# Patient Record
Sex: MALE | Race: WHITE | NOT HISPANIC OR LATINO | Employment: FULL TIME | URBAN - METROPOLITAN AREA
[De-identification: names, ages, dates, MRNs, and addresses within clinical notes are randomized per-mention and may not be internally consistent; named-entity substitution may affect disease eponyms.]

---

## 2010-03-17 LAB — CRC RECOMMENDATION EXT: NORMAL

## 2016-02-10 LAB — CRC RECOMMENDATION EXT: NORMAL

## 2019-02-25 ENCOUNTER — OFFICE VISIT (OUTPATIENT)
Dept: FAMILY MEDICINE | Facility: CLINIC | Age: 64
End: 2019-02-25
Payer: COMMERCIAL

## 2019-02-25 VITALS
OXYGEN SATURATION: 97 % | TEMPERATURE: 98 F | DIASTOLIC BLOOD PRESSURE: 72 MMHG | HEART RATE: 71 BPM | BODY MASS INDEX: 36.05 KG/M2 | SYSTOLIC BLOOD PRESSURE: 132 MMHG | WEIGHT: 257.5 LBS | HEIGHT: 71 IN

## 2019-02-25 DIAGNOSIS — R09.81 NASAL SINUS CONGESTION: Primary | ICD-10-CM

## 2019-02-25 PROCEDURE — 1101F PT FALLS ASSESS-DOCD LE1/YR: CPT | Mod: CPTII,,, | Performed by: FAMILY MEDICINE

## 2019-02-25 PROCEDURE — 1101F PR PT FALLS ASSESS DOC 0-1 FALLS W/OUT INJ PAST YR: ICD-10-PCS | Mod: CPTII,,, | Performed by: FAMILY MEDICINE

## 2019-02-25 PROCEDURE — 99213 OFFICE O/P EST LOW 20 MIN: CPT | Mod: ,,, | Performed by: FAMILY MEDICINE

## 2019-02-25 PROCEDURE — 99213 PR OFFICE/OUTPT VISIT, EST, LEVL III, 20-29 MIN: ICD-10-PCS | Mod: ,,, | Performed by: FAMILY MEDICINE

## 2019-02-25 RX ORDER — CETIRIZINE HYDROCHLORIDE 10 MG/1
10 TABLET ORAL NIGHTLY
Qty: 30 TABLET | Refills: 0 | COMMUNITY
Start: 2019-02-25 | End: 2020-02-04 | Stop reason: ALTCHOICE

## 2019-02-25 RX ORDER — ALFUZOSIN HYDROCHLORIDE 10 MG/1
TABLET, EXTENDED RELEASE ORAL
COMMUNITY

## 2019-02-25 RX ORDER — MONTELUKAST SODIUM 10 MG/1
10 TABLET ORAL DAILY
Qty: 30 TABLET | Refills: 0 | Status: SHIPPED | OUTPATIENT
Start: 2019-02-25 | End: 2019-03-27

## 2019-02-25 RX ORDER — SIMVASTATIN 20 MG/1
TABLET, FILM COATED ORAL
COMMUNITY
End: 2019-08-06

## 2019-02-25 RX ORDER — AMOXICILLIN 500 MG
1 CAPSULE ORAL WEEKLY
COMMUNITY

## 2019-02-25 NOTE — PROGRESS NOTES
"Subjective:       Patient ID: Jamal Luna is a 64 y.o. male.    Chief Complaint: Nasal Congestion (Pt stated that when he blows his nose, green phlem comes out of only his left nostril. Pt took some Mucinex DM last night.) and Sore Throat (Pt stated that he is hoarse. Pt took cough drops but did not help.)    65 yo M here for congestion x 2 days. Pt has "green stuff" coming out of the left nostril. Pt had nasal surgery 2 months ago in [place], [place]. He had nasal passages opened bilaterally at that time. Pt does not have an appt to go back. Pt now tells that 3 weeks ago the left nostril was infected and he had to be put on some antibiotics.       Review of Systems   Constitutional: Negative for chills, fatigue and fever.   HENT: Positive for congestion. Negative for drooling, sneezing and sore throat.    Eyes: Negative for pain and visual disturbance.   Respiratory: Negative for cough and shortness of breath.    Cardiovascular: Negative for chest pain.   Gastrointestinal: Negative for abdominal pain, constipation, diarrhea and nausea.   Endocrine: Negative for cold intolerance and heat intolerance.   Genitourinary: Negative for difficulty urinating and frequency.   Musculoskeletal: Negative for myalgias.   Allergic/Immunologic: Negative for food allergies.   Neurological: Negative for seizures and headaches.   Psychiatric/Behavioral: Negative for behavioral problems.       Objective:      Physical Exam   Constitutional: He appears well-developed.   HENT:   Right Ear: External ear normal.   Left Ear: External ear normal.   Mouth/Throat: Oropharynx is clear and moist.   Eyes: Conjunctivae and EOM are normal.   Neck: Normal range of motion.   Cardiovascular: Normal rate, regular rhythm and intact distal pulses.   Pulmonary/Chest: Effort normal and breath sounds normal.   Abdominal: Soft.   Musculoskeletal: Normal range of motion.   Neurological: He is alert.   Skin: Skin is warm. Capillary refill takes less than 2 " seconds.   Psychiatric: He has a normal mood and affect.   Nursing note and vitals reviewed.      Assessment:       1. Nasal sinus congestion        Plan:       PROBLEM LIST  No problem-specific Assessment & Plan notes found for this encounter.        Jamal was seen today for nasal congestion and sore throat.    Diagnoses and all orders for this visit:    Nasal sinus congestion

## 2019-02-25 NOTE — PATIENT INSTRUCTIONS
"Call your ENT surgeon in [place]. If he wants us to put you on Antibiotics he can fax us what he likes us to do. Best if there is supposed to be a "standard of care"  Right now we'll dry you out with zyrtec at night and singulair in the morning.  "

## 2019-04-11 ENCOUNTER — OFFICE VISIT (OUTPATIENT)
Dept: FAMILY MEDICINE | Facility: CLINIC | Age: 64
End: 2019-04-11
Payer: COMMERCIAL

## 2019-04-11 VITALS
TEMPERATURE: 98 F | HEIGHT: 71 IN | BODY MASS INDEX: 34.65 KG/M2 | HEART RATE: 56 BPM | SYSTOLIC BLOOD PRESSURE: 136 MMHG | OXYGEN SATURATION: 95 % | WEIGHT: 247.5 LBS | DIASTOLIC BLOOD PRESSURE: 84 MMHG

## 2019-04-11 DIAGNOSIS — N52.9 ERECTILE DYSFUNCTION, UNSPECIFIED ERECTILE DYSFUNCTION TYPE: Primary | ICD-10-CM

## 2019-04-11 PROCEDURE — 1101F PT FALLS ASSESS-DOCD LE1/YR: CPT | Mod: CPTII,S$GLB,, | Performed by: FAMILY MEDICINE

## 2019-04-11 PROCEDURE — 99213 PR OFFICE/OUTPT VISIT, EST, LEVL III, 20-29 MIN: ICD-10-PCS | Mod: S$GLB,,, | Performed by: FAMILY MEDICINE

## 2019-04-11 PROCEDURE — 1101F PR PT FALLS ASSESS DOC 0-1 FALLS W/OUT INJ PAST YR: ICD-10-PCS | Mod: CPTII,S$GLB,, | Performed by: FAMILY MEDICINE

## 2019-04-11 PROCEDURE — 99213 OFFICE O/P EST LOW 20 MIN: CPT | Mod: S$GLB,,, | Performed by: FAMILY MEDICINE

## 2019-04-11 RX ORDER — SILDENAFIL 100 MG/1
100 TABLET, FILM COATED ORAL DAILY PRN
Qty: 1 TABLET | Refills: 5 | Status: SHIPPED | OUTPATIENT
Start: 2019-04-11 | End: 2019-04-12

## 2019-04-11 RX ORDER — SILDENAFIL 100 MG/1
100 TABLET, FILM COATED ORAL DAILY PRN
Qty: 10 TABLET | Refills: 3 | Status: SHIPPED | OUTPATIENT
Start: 2019-04-11 | End: 2019-06-04 | Stop reason: SDUPTHER

## 2019-04-11 NOTE — PROGRESS NOTES
Subjective:       Patient ID: Jamal Luna is a 64 y.o. male.    Chief Complaint: Other (Pt stated that he is here to get a Rx for Viagra. Pt stated that he has had it before.)    63 yo M here for ED. Pt has had viagra 100mg in the past and it worked alright. He would like a script for just one pill. Discussed the no-brand version (sildenafil) and the GoodRx card.   No other problems or concerns    Review of Systems   Constitutional: Negative for chills, fatigue and fever.   HENT: Negative for congestion, drooling, sneezing and sore throat.    Eyes: Negative for pain and visual disturbance.   Respiratory: Negative for cough and shortness of breath.    Cardiovascular: Negative for chest pain.   Gastrointestinal: Negative for abdominal pain, constipation, diarrhea and nausea.   Endocrine: Negative for cold intolerance and heat intolerance.   Genitourinary: Negative for difficulty urinating and frequency.   Musculoskeletal: Negative for myalgias.   Allergic/Immunologic: Negative for food allergies.   Neurological: Negative for seizures and headaches.   Psychiatric/Behavioral: Negative for behavioral problems.       Objective:      Physical Exam   Constitutional: He appears well-developed.   HENT:   Right Ear: External ear normal.   Left Ear: External ear normal.   Mouth/Throat: Oropharynx is clear and moist.   Eyes: Conjunctivae and EOM are normal.   Neck: Normal range of motion.   Cardiovascular: Normal rate, regular rhythm and intact distal pulses.   Pulmonary/Chest: Effort normal and breath sounds normal.   Abdominal: Soft.   Musculoskeletal: Normal range of motion.   Neurological: He is alert.   Skin: Skin is warm. Capillary refill takes less than 2 seconds.   Psychiatric: He has a normal mood and affect.   Nursing note and vitals reviewed.      Assessment:       1. Erectile dysfunction, unspecified erectile dysfunction type        Plan:       PROBLEM LIST     Jamal was seen today for other.    Diagnoses and  all orders for this visit:    Erectile dysfunction, unspecified erectile dysfunction type    Other orders  -     sildenafil (VIAGRA) 100 MG tablet; Take 1 tablet (100 mg total) by mouth daily as needed for Erectile Dysfunction.  -     sildenafil (VIAGRA) 100 MG tablet; Take 1 tablet (100 mg total) by mouth daily as needed for Erectile Dysfunction.

## 2019-05-16 ENCOUNTER — OFFICE VISIT (OUTPATIENT)
Dept: FAMILY MEDICINE | Facility: CLINIC | Age: 64
End: 2019-05-16
Payer: COMMERCIAL

## 2019-05-16 VITALS
OXYGEN SATURATION: 96 % | WEIGHT: 250.13 LBS | HEIGHT: 71 IN | BODY MASS INDEX: 35.02 KG/M2 | SYSTOLIC BLOOD PRESSURE: 138 MMHG | DIASTOLIC BLOOD PRESSURE: 84 MMHG | TEMPERATURE: 98 F | HEART RATE: 66 BPM

## 2019-05-16 DIAGNOSIS — L02.522: Primary | ICD-10-CM

## 2019-05-16 DIAGNOSIS — N52.9 ERECTILE DYSFUNCTION, UNSPECIFIED ERECTILE DYSFUNCTION TYPE: ICD-10-CM

## 2019-05-16 PROCEDURE — 1101F PR PT FALLS ASSESS DOC 0-1 FALLS W/OUT INJ PAST YR: ICD-10-PCS | Mod: CPTII,S$GLB,, | Performed by: FAMILY MEDICINE

## 2019-05-16 PROCEDURE — 99213 OFFICE O/P EST LOW 20 MIN: CPT | Mod: S$GLB,,, | Performed by: FAMILY MEDICINE

## 2019-05-16 PROCEDURE — 99213 PR OFFICE/OUTPT VISIT, EST, LEVL III, 20-29 MIN: ICD-10-PCS | Mod: S$GLB,,, | Performed by: FAMILY MEDICINE

## 2019-05-16 PROCEDURE — 1101F PT FALLS ASSESS-DOCD LE1/YR: CPT | Mod: CPTII,S$GLB,, | Performed by: FAMILY MEDICINE

## 2019-05-16 NOTE — PATIENT INSTRUCTIONS
Use a warm wet rag on the dorsum of your hand - as much as possible, this will help with the draining  Then put a dab of the saph on and bandage and protect it.

## 2019-05-16 NOTE — PROGRESS NOTES
Subjective:       Patient ID: Jamal Luna is a 64 y.o. male.    Chief Complaint: Hand Pain (Pt stated that he has left hand pain. Pt stated that he has a sore that started about 5 wks ago but has increased in size for the past 3wks. Pt stated he has been soaking it in salt water and the swelling did come down but not all the way. Pt stated that the sore hurts more if he presses down on it.)    65 yo M here with a draining boil on his left dorsum of his hand. This has been going on for 5 weeks. Pt does not remember any cause such as a bite a sting or poke of any sort. He uses his hand to get stuff out of his left front pocket and that's when it hurts most.   He also states that the sildenafil does not work for his ED. He has only taken one pill at a time and that did not work. He also only got 10 pills dispensed as he went through his insurance and not [employer] card as instructed in his last visit. He was also re-educated that he can take up to 5 tabs of sildenafil as that would make up the same dosage of viagra 100 mg. Pt neither read my instructions nor remembered them till we discussed it again.     Review of Systems   Constitutional: Negative for chills, fatigue and fever.   HENT: Negative for congestion, drooling, sneezing and sore throat.    Eyes: Negative for pain and visual disturbance.   Respiratory: Negative for cough and shortness of breath.    Cardiovascular: Negative for chest pain.   Gastrointestinal: Negative for abdominal pain, constipation, diarrhea and nausea.   Endocrine: Negative for cold intolerance and heat intolerance.   Genitourinary: Negative for difficulty urinating and frequency.   Musculoskeletal: Negative for myalgias.   Allergic/Immunologic: Negative for food allergies.   Neurological: Negative for seizures and headaches.   Psychiatric/Behavioral: Negative for behavioral problems.       Objective:      Physical Exam   Constitutional: He appears well-developed.   HENT:   Right Ear:  External ear normal.   Left Ear: External ear normal.   Mouth/Throat: Oropharynx is clear and moist.   Eyes: Conjunctivae and EOM are normal.   Neck: Normal range of motion.   Cardiovascular: Normal rate, regular rhythm and intact distal pulses.   Pulmonary/Chest: Effort normal and breath sounds normal.   Abdominal: Soft.   Musculoskeletal: Normal range of motion.   Neurological: He is alert.   Skin: Skin is warm. Capillary refill takes less than 2 seconds.   Small scabbed ulcer on left dorsum of hand - no erythematous halo or other indications that there is any cellulitis involved   Psychiatric: He has a normal mood and affect.   Nursing note and vitals reviewed.      Assessment:       1. Boil, hand, left    2. Erectile dysfunction, unspecified erectile dysfunction type        Plan:       PROBLEM LIST     Jamal was seen today for hand pain.    Diagnoses and all orders for this visit:    Boil, hand, left    Erectile dysfunction, unspecified erectile dysfunction type    boil: use a warm wet rag on your hand wound to let the content drain out. Use the bacitracin I gave you and cover with some squishy material to protect skin from pressure/new abbrasions  ED: use sildenafil as instructed

## 2019-06-04 ENCOUNTER — OFFICE VISIT (OUTPATIENT)
Dept: FAMILY MEDICINE | Facility: CLINIC | Age: 64
End: 2019-06-04
Payer: COMMERCIAL

## 2019-06-04 VITALS
TEMPERATURE: 98 F | SYSTOLIC BLOOD PRESSURE: 132 MMHG | DIASTOLIC BLOOD PRESSURE: 94 MMHG | BODY MASS INDEX: 34.63 KG/M2 | HEART RATE: 63 BPM | HEIGHT: 71 IN | WEIGHT: 247.38 LBS | OXYGEN SATURATION: 98 %

## 2019-06-04 DIAGNOSIS — N52.9 ERECTILE DYSFUNCTION, UNSPECIFIED ERECTILE DYSFUNCTION TYPE: ICD-10-CM

## 2019-06-04 DIAGNOSIS — G47.00 INSOMNIA, UNSPECIFIED TYPE: ICD-10-CM

## 2019-06-04 DIAGNOSIS — E78.5 HYPERLIPIDEMIA, UNSPECIFIED HYPERLIPIDEMIA TYPE: Primary | ICD-10-CM

## 2019-06-04 DIAGNOSIS — J44.9 CHRONIC OBSTRUCTIVE PULMONARY DISEASE, UNSPECIFIED COPD TYPE: ICD-10-CM

## 2019-06-04 DIAGNOSIS — G47.33 OSA (OBSTRUCTIVE SLEEP APNEA): ICD-10-CM

## 2019-06-04 PROCEDURE — 99213 PR OFFICE/OUTPT VISIT, EST, LEVL III, 20-29 MIN: ICD-10-PCS | Mod: S$GLB,,, | Performed by: FAMILY MEDICINE

## 2019-06-04 PROCEDURE — 1101F PR PT FALLS ASSESS DOC 0-1 FALLS W/OUT INJ PAST YR: ICD-10-PCS | Mod: CPTII,S$GLB,, | Performed by: FAMILY MEDICINE

## 2019-06-04 PROCEDURE — 99213 OFFICE O/P EST LOW 20 MIN: CPT | Mod: S$GLB,,, | Performed by: FAMILY MEDICINE

## 2019-06-04 PROCEDURE — 1101F PT FALLS ASSESS-DOCD LE1/YR: CPT | Mod: CPTII,S$GLB,, | Performed by: FAMILY MEDICINE

## 2019-06-04 RX ORDER — SILDENAFIL 100 MG/1
100 TABLET, FILM COATED ORAL DAILY PRN
Qty: 30 TABLET | Refills: 0 | Status: SHIPPED | OUTPATIENT
Start: 2019-06-04 | End: 2020-06-17 | Stop reason: SDUPTHER

## 2019-06-04 NOTE — PROGRESS NOTES
Subjective:       Patient ID: Jamal Luna is a 64 y.o. male.    Chief Complaint: Medication Problem (Pt is here to see if he can get something equivalant to the simvastatin that he is on. Pt stated that he has been having aches & pains through out the day & night. Pt stated that he is unable to stay asleep and wakes up very early. Pt stated that since he has not been sleeping he feels very fatigue through out the day as well. Pt stated that he can't concentrate on anything. Pt stated that he has had the symptoms for about 3mo and he stopped his simvastatin 2wks ago.) and Referral needed (Pt stated that he would like a referral to see a pulmonologist about his COPD.)    63 yo M here to discuss his medication. Pt was put on simvastatin for years. Pt thinks his joint soreness and insomnia might be related to him taking simvastatin 20mg. He took himself off 2 weeks ago and his joints feel better but he still has insomnia. Pt has CARRILLO and he is on a CPAP.   Pt stated that since he has not been sleeping he feels very fatigue through out the day as well. Pt stated that he can't concentrate on anything.  Pt needs a refill on sildenafil - explained again that this med is as needed only        Review of Systems   Constitutional: Positive for fatigue. Negative for chills and fever.   HENT: Negative for congestion, drooling, sneezing and sore throat.    Eyes: Negative for pain and visual disturbance.   Respiratory: Positive for cough. Negative for shortness of breath.    Cardiovascular: Negative for chest pain.   Gastrointestinal: Negative for abdominal pain, constipation, diarrhea and nausea.   Endocrine: Negative for cold intolerance and heat intolerance.   Genitourinary: Negative for difficulty urinating and frequency.   Musculoskeletal: Negative for myalgias.   Allergic/Immunologic: Negative for food allergies.   Neurological: Negative for seizures and headaches.   Psychiatric/Behavioral: Negative for behavioral  problems.       Objective:      Physical Exam   Constitutional: He appears well-developed.   HENT:   Right Ear: External ear normal.   Left Ear: External ear normal.   Mouth/Throat: Oropharynx is clear and moist.   Eyes: Conjunctivae and EOM are normal.   Neck: Normal range of motion.   Cardiovascular: Normal rate, regular rhythm and intact distal pulses.   Pulmonary/Chest: Effort normal and breath sounds normal.   Abdominal: Soft.   Musculoskeletal: Normal range of motion.   Neurological: He is alert.   Skin: Skin is warm. Capillary refill takes less than 2 seconds.   Psychiatric: He has a normal mood and affect.   Nursing note and vitals reviewed.      Assessment:       1. Hyperlipidemia, unspecified hyperlipidemia type    2. Chronic obstructive pulmonary disease, unspecified COPD type    3. Insomnia, unspecified type    4. CARRILLO (obstructive sleep apnea)    5. Erectile dysfunction, unspecified erectile dysfunction type        Plan:       PROBLEM LIST     Jamal was seen today for medication problem and referral needed.    Diagnoses and all orders for this visit:    Hyperlipidemia, unspecified hyperlipidemia type    Chronic obstructive pulmonary disease, unspecified COPD type  -     Ambulatory Referral to Pulmonology    Insomnia, unspecified type    CARRILLO (obstructive sleep apnea)  -     Ambulatory Referral to Pulmonology    Erectile dysfunction, unspecified erectile dysfunction type  -     sildenafil (VIAGRA) 100 MG tablet; Take 1 tablet (100 mg total) by mouth daily as needed for Erectile Dysfunction.

## 2019-06-24 ENCOUNTER — OFFICE VISIT (OUTPATIENT)
Dept: FAMILY MEDICINE | Facility: CLINIC | Age: 64
End: 2019-06-24
Payer: COMMERCIAL

## 2019-06-24 VITALS
WEIGHT: 243 LBS | TEMPERATURE: 98 F | BODY MASS INDEX: 34.02 KG/M2 | HEIGHT: 71 IN | DIASTOLIC BLOOD PRESSURE: 62 MMHG | HEART RATE: 72 BPM | OXYGEN SATURATION: 98 % | SYSTOLIC BLOOD PRESSURE: 130 MMHG | RESPIRATION RATE: 16 BRPM

## 2019-06-24 DIAGNOSIS — B02.9 HERPES ZOSTER WITHOUT COMPLICATION: Primary | ICD-10-CM

## 2019-06-24 DIAGNOSIS — E78.00 PURE HYPERCHOLESTEROLEMIA: ICD-10-CM

## 2019-06-24 DIAGNOSIS — Z12.5 SCREENING FOR MALIGNANT NEOPLASM OF PROSTATE: ICD-10-CM

## 2019-06-24 PROCEDURE — 1101F PT FALLS ASSESS-DOCD LE1/YR: CPT | Mod: CPTII,S$GLB,, | Performed by: NURSE PRACTITIONER

## 2019-06-24 PROCEDURE — 1101F PR PT FALLS ASSESS DOC 0-1 FALLS W/OUT INJ PAST YR: ICD-10-PCS | Mod: CPTII,S$GLB,, | Performed by: NURSE PRACTITIONER

## 2019-06-24 PROCEDURE — 99213 PR OFFICE/OUTPT VISIT, EST, LEVL III, 20-29 MIN: ICD-10-PCS | Mod: S$GLB,,, | Performed by: NURSE PRACTITIONER

## 2019-06-24 PROCEDURE — 99213 OFFICE O/P EST LOW 20 MIN: CPT | Mod: S$GLB,,, | Performed by: NURSE PRACTITIONER

## 2019-06-24 RX ORDER — GABAPENTIN 300 MG/1
300 CAPSULE ORAL 3 TIMES DAILY PRN
Qty: 90 CAPSULE | Refills: 0 | Status: SHIPPED | OUTPATIENT
Start: 2019-06-24 | End: 2019-07-01 | Stop reason: DRUGHIGH

## 2019-06-24 NOTE — PROGRESS NOTES
Subjective:       Patient ID: Jamal Luna is a 64 y.o. male.    Chief Complaint: Follow-up (F/U for shingles)    HPI     Lesion outbreak on scalp. Pain on scalp started last Wed. Blister-type lesions erupted last Friday on left side of scalp. Went to the ED in [place]. He was told he had shingles. Started on Valtrex x 1 week. Lesions are better, starting to dry. Pain is still bad. Prevents him from sleeping at night.       Review of Systems   Constitutional: Negative for activity change, appetite change, chills, fatigue and fever.   Respiratory: Negative for apnea, cough, chest tightness, shortness of breath and wheezing.    Cardiovascular: Negative for chest pain, palpitations and leg swelling.   Gastrointestinal: Negative for abdominal distention, abdominal pain, blood in stool, constipation, diarrhea and vomiting.   Genitourinary: Negative for difficulty urinating, dysuria, flank pain, frequency and urgency.   Musculoskeletal: Negative for arthralgias, back pain, gait problem, joint swelling and neck pain.   Skin: Positive for rash. Negative for color change and pallor.   Neurological: Negative for dizziness, tremors, syncope, weakness, light-headedness and numbness.   Hematological: Negative for adenopathy. Does not bruise/bleed easily.   Psychiatric/Behavioral: Negative for agitation, confusion and sleep disturbance. The patient is not nervous/anxious.        Objective:      Physical Exam   Constitutional: He is oriented to person, place, and time. Vital signs are normal. He appears well-developed and well-nourished.   HENT:   Head: Normocephalic and atraumatic.   Mouth/Throat: Oropharynx is clear and moist.   Eyes: Pupils are equal, round, and reactive to light. Conjunctivae are normal.   Neck: Trachea normal and normal range of motion. Neck supple. Carotid bruit is not present.   Cardiovascular: Normal rate, regular rhythm and normal heart sounds.   Pulmonary/Chest: Effort normal and breath sounds  normal.   Abdominal: Soft. Bowel sounds are normal.   Musculoskeletal: Normal range of motion.   Neurological: He is alert and oriented to person, place, and time.   Skin: Skin is warm, dry and intact. Rash (drying hepetic lesions left side of scalp, left of sagital suture line on top of head) noted.   Psychiatric: He has a normal mood and affect. His speech is normal and behavior is normal. Judgment normal.       Assessment:       1. Herpes zoster without complication    2. Pure hypercholesterolemia    3. Screening for malignant neoplasm of prostate        Plan:       PROBLEM LIST     Jamal was seen today for follow-up.    Diagnoses and all orders for this visit:    Herpes zoster without complication  -     gabapentin (NEURONTIN) 300 MG capsule; Take 1 capsule (300 mg total) by mouth 3 (three) times daily as needed.    Pure hypercholesterolemia  -     CBC auto differential; Future  -     Comprehensive metabolic panel; Future  -     Lipid panel; Future  -     Hemoglobin A1c; Future  -     CBC auto differential  -     Comprehensive metabolic panel  -     Lipid panel  -     Hemoglobin A1c    Screening for malignant neoplasm of prostate  -     Prostate Specific Antigen, Diagnostic; Future  -     Prostate Specific Antigen, Diagnostic    Take all Valtrex until complete. Discussed getting Shingrix vaccine 6 mo after lesions fully heal. Start on gabapentin for pain.    Pt requesting annual wellness labs. Provided w/ lab orders. RTC next week for lab review. He states that he has not been compliant w/ his statin therapy.

## 2019-06-25 LAB
ABS NRBC COUNT: 0 X 10 3/UL (ref 0–0.01)
ABSOLUTE BASOPHIL: 0.03 X 10 3/UL (ref 0–0.22)
ABSOLUTE EOSINOPHIL: 0.28 X 10 3/UL (ref 0.04–0.54)
ABSOLUTE IMMATURE GRAN: 0.02 X 10 3/UL (ref 0–0.04)
ABSOLUTE LYMPHOCYTE: 1 X 10 3/UL (ref 0.86–4.75)
ABSOLUTE MONOCYTE: 0.62 X 10 3/UL (ref 0.22–1.08)
ALBUMIN SERPL-MCNC: 4.3 G/DL (ref 3.5–5.2)
ALBUMIN/GLOB SERPL ELPH: 1.4 {RATIO} (ref 1–2.7)
ALP ISOS SERPL LEV INH-CCNC: 78 IU/L (ref 40–130)
ALT (SGPT): 30 U/L (ref 0–41)
ANION GAP SERPL CALC-SCNC: 13 MMOL/L (ref 8–17)
AST SERPL-CCNC: 23 U/L (ref 0–40)
BASOPHILS NFR BLD: 0.5 %
BILIRUBIN, TOTAL: 0.41 MG/DL (ref 0–1.2)
BUN/CREAT SERPL: 15.2 (ref 6–20)
CALCIUM SERPL-MCNC: 9.8 MG/DL (ref 8.6–10.2)
CARBON DIOXIDE, CO2: 31 MMOL/L (ref 22–29)
CHLORIDE: 99 MMOL/L (ref 98–107)
CHOLEST SERPL-MSCNC: 178 MG/DL (ref 100–200)
CREAT SERPL-MCNC: 0.99 MG/DL (ref 0.7–1.2)
EOSINOPHIL NFR BLD: 4.7 %
ESTIMATED AVERAGE GLUCOSE: 137 MG/DL
GFR ESTIMATION: 75.63
GLOBULIN: 3.1 G/DL (ref 1.5–4.5)
GLUCOSE: 114 MG/DL (ref 82–115)
HBA1C MFR BLD: 6.4 % (ref 4–6)
HCT VFR BLD AUTO: 47.1 % (ref 42–52)
HDLC SERPL-MCNC: 42 MG/DL
HGB BLD-MCNC: 15.1 G/DL (ref 14–18)
IMMATURE GRANULOCYTES: 0.3 % (ref 0–0.5)
LDL/HDL RATIO: 2.7 (ref 1–3)
LDLC SERPL CALC-MCNC: 112 MG/DL (ref 0–100)
LYMPHOCYTES NFR BLD: 16.8 %
MCH RBC QN AUTO: 31.1 PG (ref 27–32)
MCHC RBC AUTO-ENTMCNC: 32.1 G/DL (ref 32–36)
MCV RBC AUTO: 97.1 FL (ref 80–94)
MONOCYTES NFR BLD: 10.4 %
NEUTROPHILS ABSOLUTE COUNT: 3.99 X 10 3/UL (ref 2.15–7.56)
NEUTROPHILS NFR BLD: 67.3 %
NUCLEATED RED BLOOD CELLS: 0 /100 WBC (ref 0–0.2)
PLATELET # BLD AUTO: 210 X 10 3/UL (ref 135–400)
POTASSIUM: 5.1 MMOL/L (ref 3.5–5.1)
PROT SNV-MCNC: 7.4 G/DL (ref 6.4–8.3)
PSA: 1.9 NG/ML (ref 0–4)
RBC # BLD AUTO: 4.85 X 10 6/UL (ref 4.7–6.1)
RDW-SD: 54.4 FL (ref 37–54)
SODIUM: 143 MMOL/L (ref 136–145)
TRIGL SERPL-MCNC: 120 MG/DL (ref 0–150)
UREA NITROGEN (BUN): 15 MG/DL (ref 8–23)
WBC # BLD: 5.94 X 10 3/UL (ref 4.3–10.8)

## 2019-07-01 ENCOUNTER — OFFICE VISIT (OUTPATIENT)
Dept: FAMILY MEDICINE | Facility: CLINIC | Age: 64
End: 2019-07-01
Payer: COMMERCIAL

## 2019-07-01 VITALS
SYSTOLIC BLOOD PRESSURE: 130 MMHG | TEMPERATURE: 97 F | BODY MASS INDEX: 34.95 KG/M2 | OXYGEN SATURATION: 97 % | RESPIRATION RATE: 18 BRPM | HEIGHT: 71 IN | DIASTOLIC BLOOD PRESSURE: 84 MMHG | WEIGHT: 249.63 LBS | HEART RATE: 68 BPM

## 2019-07-01 DIAGNOSIS — J44.9 CHRONIC OBSTRUCTIVE PULMONARY DISEASE, UNSPECIFIED COPD TYPE: ICD-10-CM

## 2019-07-01 DIAGNOSIS — G47.09 OTHER INSOMNIA: ICD-10-CM

## 2019-07-01 DIAGNOSIS — R73.03 PREDIABETES: ICD-10-CM

## 2019-07-01 DIAGNOSIS — E78.00 PURE HYPERCHOLESTEROLEMIA: ICD-10-CM

## 2019-07-01 DIAGNOSIS — B02.9 HERPES ZOSTER WITHOUT COMPLICATION: Primary | ICD-10-CM

## 2019-07-01 PROCEDURE — 1101F PT FALLS ASSESS-DOCD LE1/YR: CPT | Mod: CPTII,S$GLB,, | Performed by: NURSE PRACTITIONER

## 2019-07-01 PROCEDURE — 99213 PR OFFICE/OUTPT VISIT, EST, LEVL III, 20-29 MIN: ICD-10-PCS | Mod: S$GLB,,, | Performed by: NURSE PRACTITIONER

## 2019-07-01 PROCEDURE — 1101F PR PT FALLS ASSESS DOC 0-1 FALLS W/OUT INJ PAST YR: ICD-10-PCS | Mod: CPTII,S$GLB,, | Performed by: NURSE PRACTITIONER

## 2019-07-01 PROCEDURE — 99213 OFFICE O/P EST LOW 20 MIN: CPT | Mod: S$GLB,,, | Performed by: NURSE PRACTITIONER

## 2019-07-01 RX ORDER — GABAPENTIN 400 MG/1
400 CAPSULE ORAL 3 TIMES DAILY
Qty: 90 CAPSULE | Refills: 3 | Status: SHIPPED | OUTPATIENT
Start: 2019-07-01 | End: 2020-02-04 | Stop reason: ALTCHOICE

## 2019-07-01 RX ORDER — TAMSULOSIN HYDROCHLORIDE 0.4 MG/1
1 CAPSULE ORAL DAILY
Refills: 3 | COMMUNITY
Start: 2019-05-09 | End: 2019-10-30

## 2019-07-01 NOTE — PROGRESS NOTES
Subjective:       Patient ID: Jamal Luna is a 64 y.o. male.    Chief Complaint: Follow-up (F/u for shingles and lab results)    HPI     Lesion outbreak on scalp. Pain on scalp started last Wed. Blister-type lesions erupted last Friday on left side of scalp. Went to the ED in [place]. He was told he had shingles. Started on Valtrex x 1 week. Lesions are better, starting to dry. Pain is still bad. Prevents him from sleeping at night.       Review of Systems   Constitutional: Negative for activity change, appetite change, chills, fatigue and fever.   Respiratory: Negative for apnea, cough, chest tightness, shortness of breath and wheezing.    Cardiovascular: Negative for chest pain, palpitations and leg swelling.   Gastrointestinal: Negative for abdominal distention, abdominal pain, blood in stool, constipation, diarrhea and vomiting.   Genitourinary: Negative for difficulty urinating, dysuria, flank pain, frequency and urgency.   Musculoskeletal: Negative for arthralgias, back pain, gait problem, joint swelling and neck pain.   Skin: Positive for rash. Negative for color change and pallor.   Neurological: Negative for dizziness, tremors, syncope, weakness, light-headedness and numbness.   Hematological: Negative for adenopathy. Does not bruise/bleed easily.   Psychiatric/Behavioral: Negative for agitation, confusion and sleep disturbance. The patient is not nervous/anxious.        Objective:      Physical Exam   Constitutional: He is oriented to person, place, and time. Vital signs are normal. He appears well-developed and well-nourished.   HENT:   Head: Normocephalic and atraumatic.   Mouth/Throat: Oropharynx is clear and moist.   Eyes: Pupils are equal, round, and reactive to light. Conjunctivae are normal.   Neck: Trachea normal and normal range of motion. Neck supple. Carotid bruit is not present.   Cardiovascular: Normal rate, regular rhythm and normal heart sounds.   Pulmonary/Chest: Effort normal and  breath sounds normal.   Abdominal: Soft. Bowel sounds are normal.   Musculoskeletal: Normal range of motion.   Neurological: He is alert and oriented to person, place, and time.   Skin: Skin is warm, dry and intact. Rash (drying hepetic lesions left side of scalp, left of sagital suture line on top of head) noted.   Psychiatric: He has a normal mood and affect. His speech is normal and behavior is normal. Judgment normal.       Assessment:       1. Herpes zoster without complication    2. Pure hypercholesterolemia    3. Chronic obstructive pulmonary disease, unspecified COPD type    4. Other insomnia    5. Prediabetes        Plan:       PROBLEM LIST     Jamal was seen today for follow-up.    Diagnoses and all orders for this visit:    Herpes zoster without complication  Completed all valtrex.  Discussed getting Shingrix vaccine 6 mo after lesions fully heal. Increasing  gabapentin from 300 mg to 400 mg for pain.      Pure hypercholesterolemia  Reviewed labs w/ him. Continue stating therapy.     Chronic obstructive pulmonary disease, unspecified COPD type    Other insomnia    Prediabetes  Discussed diet exercise and weight loss to reduce diabetic risks

## 2019-08-06 ENCOUNTER — OFFICE VISIT (OUTPATIENT)
Dept: FAMILY MEDICINE | Facility: CLINIC | Age: 64
End: 2019-08-06
Payer: COMMERCIAL

## 2019-08-06 VITALS
WEIGHT: 243 LBS | DIASTOLIC BLOOD PRESSURE: 64 MMHG | HEART RATE: 87 BPM | OXYGEN SATURATION: 97 % | SYSTOLIC BLOOD PRESSURE: 130 MMHG | HEIGHT: 71 IN | BODY MASS INDEX: 34.02 KG/M2 | TEMPERATURE: 98 F

## 2019-08-06 DIAGNOSIS — Z09 HOSPITAL DISCHARGE FOLLOW-UP: Primary | ICD-10-CM

## 2019-08-06 DIAGNOSIS — Z86.73 HISTORY OF CARDIOEMBOLIC CEREBROVASCULAR ACCIDENT (CVA): ICD-10-CM

## 2019-08-06 DIAGNOSIS — Z86.19 HISTORY OF SHINGLES: ICD-10-CM

## 2019-08-06 DIAGNOSIS — L98.9 SKIN LESION OF HAND: ICD-10-CM

## 2019-08-06 PROCEDURE — 1101F PR PT FALLS ASSESS DOC 0-1 FALLS W/OUT INJ PAST YR: ICD-10-PCS | Mod: CPTII,S$GLB,, | Performed by: FAMILY MEDICINE

## 2019-08-06 PROCEDURE — 1101F PT FALLS ASSESS-DOCD LE1/YR: CPT | Mod: CPTII,S$GLB,, | Performed by: FAMILY MEDICINE

## 2019-08-06 PROCEDURE — 99213 OFFICE O/P EST LOW 20 MIN: CPT | Mod: S$GLB,,, | Performed by: FAMILY MEDICINE

## 2019-08-06 PROCEDURE — 99213 PR OFFICE/OUTPT VISIT, EST, LEVL III, 20-29 MIN: ICD-10-PCS | Mod: S$GLB,,, | Performed by: FAMILY MEDICINE

## 2019-08-06 RX ORDER — SILODOSIN 8 MG/1
1 CAPSULE ORAL DAILY
Refills: 3 | COMMUNITY
Start: 2019-07-10 | End: 2020-02-04 | Stop reason: ALTCHOICE

## 2019-08-06 RX ORDER — ASPIRIN 81 MG/1
1 TABLET ORAL DAILY
Refills: 3 | COMMUNITY
Start: 2019-07-29 | End: 2020-02-04 | Stop reason: ALTCHOICE

## 2019-08-06 RX ORDER — ATORVASTATIN CALCIUM 20 MG/1
1 TABLET, FILM COATED ORAL DAILY
Refills: 3 | COMMUNITY
Start: 2019-07-29 | End: 2019-10-30 | Stop reason: SDUPTHER

## 2019-08-06 RX ORDER — CLOPIDOGREL BISULFATE 75 MG/1
1 TABLET ORAL DAILY
Refills: 0 | COMMUNITY
Start: 2019-08-03 | End: 2019-10-30 | Stop reason: SDUPTHER

## 2019-08-07 PROBLEM — Z86.19 HISTORY OF SHINGLES: Status: ACTIVE | Noted: 2019-08-07

## 2019-08-07 PROBLEM — L98.9 SKIN LESION OF HAND: Status: ACTIVE | Noted: 2019-08-07

## 2019-08-07 NOTE — PATIENT INSTRUCTIONS
we will find out whether Fiona can remove the skin lesion over middle finger nail matrix  The shingles-pain is a sequelae which we are expecting to subside

## 2019-08-15 ENCOUNTER — OFFICE VISIT (OUTPATIENT)
Dept: FAMILY MEDICINE | Facility: CLINIC | Age: 64
End: 2019-08-15
Payer: COMMERCIAL

## 2019-08-15 VITALS
BODY MASS INDEX: 33.63 KG/M2 | SYSTOLIC BLOOD PRESSURE: 130 MMHG | OXYGEN SATURATION: 97 % | HEART RATE: 75 BPM | RESPIRATION RATE: 18 BRPM | WEIGHT: 240.19 LBS | HEIGHT: 71 IN | TEMPERATURE: 97 F | DIASTOLIC BLOOD PRESSURE: 72 MMHG

## 2019-08-15 DIAGNOSIS — M67.40 GANGLION CYST: Primary | ICD-10-CM

## 2019-08-15 DIAGNOSIS — L60.9 ABNORMALITY OF NAIL SURFACE: ICD-10-CM

## 2019-08-15 PROCEDURE — 1101F PR PT FALLS ASSESS DOC 0-1 FALLS W/OUT INJ PAST YR: ICD-10-PCS | Mod: CPTII,S$GLB,, | Performed by: NURSE PRACTITIONER

## 2019-08-15 PROCEDURE — 99213 OFFICE O/P EST LOW 20 MIN: CPT | Mod: S$GLB,,, | Performed by: NURSE PRACTITIONER

## 2019-08-15 PROCEDURE — 99213 PR OFFICE/OUTPT VISIT, EST, LEVL III, 20-29 MIN: ICD-10-PCS | Mod: S$GLB,,, | Performed by: NURSE PRACTITIONER

## 2019-08-15 PROCEDURE — 1101F PT FALLS ASSESS-DOCD LE1/YR: CPT | Mod: CPTII,S$GLB,, | Performed by: NURSE PRACTITIONER

## 2019-08-15 NOTE — PROGRESS NOTES
Subjective:       Patient ID: Jamal Luna is a 64 y.o. male.    Chief Complaint: Procedure (problem with nail on Left middle finger)    HPI   Nodule at base of nail on left middle finger. He is requesting to have it removed. Not painful, but its putting pressure on his nail as it grows, causing a dent in the nail.    Review of Systems   Musculoskeletal: Negative.    Skin: Negative for color change, pallor, rash and wound.   Hematological: Negative for adenopathy. Bruises/bleeds easily.       Objective:      Physical Exam   Constitutional: He appears well-developed and well-nourished.   Pulmonary/Chest: Effort normal.   Skin: Skin is warm and dry. No rash noted. No erythema. No pallor.   Small 7 mm pearly ganglion cyst w/ base of nail on 3rd digit left hand. Injected small amt lidocaine around base and made small 2 mm slit w/ scapel. Drained gelatinous fluid which flatted out skin where cyst was. Applied bacitracin and bandaid.        Assessment:       1. Ganglion cyst    2. Abnormality of nail surface        Plan:       PROBLEM LIST     Jamal was seen today for procedure.    Diagnoses and all orders for this visit:    Ganglion cyst    Abnormality of nail surface    small ganglion cyst at base of nail distorting the surface of nail causing dented appearance as the nail grows. Drained small amt gelatinous fluid from ganglion cyst without difficulty.  If it returns, capsule will have to be excised.

## 2019-10-30 ENCOUNTER — OFFICE VISIT (OUTPATIENT)
Dept: FAMILY MEDICINE | Facility: CLINIC | Age: 64
End: 2019-10-30
Payer: COMMERCIAL

## 2019-10-30 VITALS
BODY MASS INDEX: 36.45 KG/M2 | WEIGHT: 260.38 LBS | SYSTOLIC BLOOD PRESSURE: 160 MMHG | TEMPERATURE: 98 F | OXYGEN SATURATION: 95 % | HEART RATE: 67 BPM | HEIGHT: 71 IN | DIASTOLIC BLOOD PRESSURE: 88 MMHG | RESPIRATION RATE: 16 BRPM

## 2019-10-30 DIAGNOSIS — Z86.73 HISTORY OF CARDIOEMBOLIC CEREBROVASCULAR ACCIDENT (CVA): Primary | ICD-10-CM

## 2019-10-30 DIAGNOSIS — E78.5 HYPERLIPIDEMIA, UNSPECIFIED HYPERLIPIDEMIA TYPE: ICD-10-CM

## 2019-10-30 PROCEDURE — 1101F PT FALLS ASSESS-DOCD LE1/YR: CPT | Mod: CPTII,S$GLB,, | Performed by: FAMILY MEDICINE

## 2019-10-30 PROCEDURE — 1101F PR PT FALLS ASSESS DOC 0-1 FALLS W/OUT INJ PAST YR: ICD-10-PCS | Mod: CPTII,S$GLB,, | Performed by: FAMILY MEDICINE

## 2019-10-30 PROCEDURE — 99213 PR OFFICE/OUTPT VISIT, EST, LEVL III, 20-29 MIN: ICD-10-PCS | Mod: S$GLB,,, | Performed by: FAMILY MEDICINE

## 2019-10-30 PROCEDURE — 99213 OFFICE O/P EST LOW 20 MIN: CPT | Mod: S$GLB,,, | Performed by: FAMILY MEDICINE

## 2019-10-30 RX ORDER — ATORVASTATIN CALCIUM 80 MG/1
80 TABLET, FILM COATED ORAL DAILY
Qty: 90 TABLET | Refills: 3 | Status: SHIPPED | OUTPATIENT
Start: 2019-10-30 | End: 2020-02-04 | Stop reason: SDUPTHER

## 2019-10-30 RX ORDER — CLOPIDOGREL BISULFATE 75 MG/1
75 TABLET ORAL DAILY
Qty: 90 TABLET | Refills: 3 | Status: SHIPPED | OUTPATIENT
Start: 2019-10-30 | End: 2020-01-02 | Stop reason: SDUPTHER

## 2019-10-30 NOTE — PROGRESS NOTES
"Subjective:       Patient ID: Jamal Luna is a 64 y.o. male.    Chief Complaint: Follow-up (pt is here for follow up pt had a stroke back in july. and needs refills on his plavix  and atrovastatin)    Jamal Luna is a 64 y.o. male here today for f/u on stroke management. Pt is on plavix and on aspirin. Pt needs a refill on plavix as he only got 90 days from the hospital written for.   Pt also is on atorvastatin - he was put on it in the hospital from my simvastatin. However, he is only on 20 mg and he should be on full dose after a stroke.   Pt does not need any other meds . Pt has moved to [place] in [place] as his "side kick" is from there.  Pt will change his PCP to closer to [place] as this is 2.5 hrs away from us and inconvenient.        Review of Systems   Constitutional: Negative for chills, fatigue and fever.   HENT: Negative for congestion, drooling, sneezing and sore throat.    Eyes: Negative for pain and visual disturbance.   Respiratory: Negative for cough and shortness of breath.    Cardiovascular: Negative for chest pain.   Gastrointestinal: Negative for abdominal pain, constipation, diarrhea and nausea.   Endocrine: Negative for cold intolerance and heat intolerance.   Genitourinary: Negative for difficulty urinating and frequency.   Musculoskeletal: Negative for myalgias.   Allergic/Immunologic: Negative for food allergies.   Neurological: Negative for seizures and headaches.   Psychiatric/Behavioral: Negative for behavioral problems.       Objective:      Physical Exam   Constitutional: He appears well-developed.   HENT:   Right Ear: External ear normal.   Left Ear: External ear normal.   Mouth/Throat: Oropharynx is clear and moist.   Eyes: Conjunctivae and EOM are normal.   Neck: Normal range of motion.   Cardiovascular: Normal rate, regular rhythm and intact distal pulses.   Pulmonary/Chest: Effort normal and breath sounds normal.   Abdominal: Soft.   Musculoskeletal: Normal range of motion. "   Neurological: He is alert.   Skin: Skin is warm. Capillary refill takes less than 2 seconds.   Psychiatric: He has a normal mood and affect.   Nursing note and vitals reviewed.      Assessment:       1. History of cardioembolic cerebrovascular accident (CVA)    2. Hyperlipidemia, unspecified hyperlipidemia type        Plan:       PROBLEM LIST     Jamal was seen today for follow-up.    Diagnoses and all orders for this visit:    History of cardioembolic cerebrovascular accident (CVA)  -     clopidogrel (PLAVIX) 75 mg tablet; Take 1 tablet (75 mg total) by mouth once daily.  -     atorvastatin (LIPITOR) 80 MG tablet; Take 1 tablet (80 mg total) by mouth once daily.    Hyperlipidemia, unspecified hyperlipidemia type

## 2020-01-02 ENCOUNTER — OFFICE VISIT (OUTPATIENT)
Dept: FAMILY MEDICINE | Facility: CLINIC | Age: 65
End: 2020-01-02
Payer: COMMERCIAL

## 2020-01-02 VITALS
RESPIRATION RATE: 16 BRPM | DIASTOLIC BLOOD PRESSURE: 71 MMHG | WEIGHT: 254 LBS | BODY MASS INDEX: 35.56 KG/M2 | SYSTOLIC BLOOD PRESSURE: 148 MMHG | HEART RATE: 66 BPM | HEIGHT: 71 IN | OXYGEN SATURATION: 98 % | TEMPERATURE: 97 F

## 2020-01-02 DIAGNOSIS — R73.03 PREDIABETES: ICD-10-CM

## 2020-01-02 DIAGNOSIS — I10 ESSENTIAL HYPERTENSION: ICD-10-CM

## 2020-01-02 DIAGNOSIS — Z12.5 PROSTATE CANCER SCREENING: ICD-10-CM

## 2020-01-02 DIAGNOSIS — N40.0 BENIGN PROSTATIC HYPERPLASIA WITHOUT LOWER URINARY TRACT SYMPTOMS: ICD-10-CM

## 2020-01-02 DIAGNOSIS — E78.00 PURE HYPERCHOLESTEROLEMIA: Primary | ICD-10-CM

## 2020-01-02 DIAGNOSIS — Z86.73 HISTORY OF CARDIOEMBOLIC CEREBROVASCULAR ACCIDENT (CVA): ICD-10-CM

## 2020-01-02 DIAGNOSIS — J44.9 CHRONIC OBSTRUCTIVE PULMONARY DISEASE, UNSPECIFIED COPD TYPE: ICD-10-CM

## 2020-01-02 PROCEDURE — 99214 PR OFFICE/OUTPT VISIT, EST, LEVL IV, 30-39 MIN: ICD-10-PCS | Mod: S$GLB,,, | Performed by: NURSE PRACTITIONER

## 2020-01-02 PROCEDURE — 1159F PR MEDICATION LIST DOCUMENTED IN MEDICAL RECORD: ICD-10-PCS | Mod: S$GLB,,, | Performed by: NURSE PRACTITIONER

## 2020-01-02 PROCEDURE — 3077F PR MOST RECENT SYSTOLIC BLOOD PRESSURE >= 140 MM HG: ICD-10-PCS | Mod: CPTII,S$GLB,, | Performed by: NURSE PRACTITIONER

## 2020-01-02 PROCEDURE — 3077F SYST BP >= 140 MM HG: CPT | Mod: CPTII,S$GLB,, | Performed by: NURSE PRACTITIONER

## 2020-01-02 PROCEDURE — 1101F PT FALLS ASSESS-DOCD LE1/YR: CPT | Mod: CPTII,S$GLB,, | Performed by: NURSE PRACTITIONER

## 2020-01-02 PROCEDURE — 3078F DIAST BP <80 MM HG: CPT | Mod: CPTII,S$GLB,, | Performed by: NURSE PRACTITIONER

## 2020-01-02 PROCEDURE — 99214 OFFICE O/P EST MOD 30 MIN: CPT | Mod: S$GLB,,, | Performed by: NURSE PRACTITIONER

## 2020-01-02 PROCEDURE — 3078F PR MOST RECENT DIASTOLIC BLOOD PRESSURE < 80 MM HG: ICD-10-PCS | Mod: CPTII,S$GLB,, | Performed by: NURSE PRACTITIONER

## 2020-01-02 PROCEDURE — 1101F PR PT FALLS ASSESS DOC 0-1 FALLS W/OUT INJ PAST YR: ICD-10-PCS | Mod: CPTII,S$GLB,, | Performed by: NURSE PRACTITIONER

## 2020-01-02 PROCEDURE — 1159F MED LIST DOCD IN RCRD: CPT | Mod: S$GLB,,, | Performed by: NURSE PRACTITIONER

## 2020-01-02 RX ORDER — CLOPIDOGREL BISULFATE 75 MG/1
75 TABLET ORAL DAILY
Qty: 90 TABLET | Refills: 3 | Status: SHIPPED | OUTPATIENT
Start: 2020-01-02 | End: 2020-12-02 | Stop reason: SDUPTHER

## 2020-01-02 NOTE — PROGRESS NOTES
Subjective:       Patient ID: Jamal Luna is a 64 y.o. male.    Chief Complaint: Follow-up (Stroke f/u, refill of Plavix)    HPI     Stroke last year, compliant w/ his Plavix. Taken off aspirin by his cardiologist.     Chronic Disease F/U  Reported by patient.     Prediabetes well controlled; compliant with diet; regular exercise     Hyperlipidemia well controlled; compliant with medications; no side effects; healthy diet and exercise     Hypertension well controlled; compliant with medications; no side effects; healthy diet and exercise     BPH well controlled; compliant with medications; no side effects; healthy diet and exercise    Review of Systems   Constitutional: Negative for activity change, appetite change, chills, fatigue and fever.   Respiratory: Negative for apnea, cough, chest tightness, shortness of breath and wheezing.    Cardiovascular: Negative for chest pain, palpitations and leg swelling.   Gastrointestinal: Negative for abdominal distention, abdominal pain, blood in stool, constipation, diarrhea and vomiting.   Genitourinary: Negative for difficulty urinating, dysuria, flank pain, frequency and urgency.   Musculoskeletal: Negative for arthralgias, back pain, gait problem, joint swelling and neck pain.   Skin: Negative for color change, pallor and rash.   Neurological: Negative for dizziness, tremors, syncope, weakness, light-headedness and numbness.   Hematological: Negative for adenopathy. Does not bruise/bleed easily.   Psychiatric/Behavioral: Negative for agitation, confusion and sleep disturbance. The patient is not nervous/anxious.        Objective:      Physical Exam   Constitutional: He is oriented to person, place, and time. Vital signs are normal. He appears well-developed and well-nourished.   HENT:   Head: Normocephalic and atraumatic.   Mouth/Throat: Oropharynx is clear and moist.   Eyes: Pupils are equal, round, and reactive to light. Conjunctivae are normal.   Neck: Trachea  normal and normal range of motion. Neck supple. Carotid bruit is not present.   Cardiovascular: Normal rate, regular rhythm and normal heart sounds.   Pulmonary/Chest: Effort normal and breath sounds normal.   Abdominal: Soft. Bowel sounds are normal.   Musculoskeletal: Normal range of motion.   Neurological: He is alert and oriented to person, place, and time.   Skin: Skin is warm, dry and intact.   Psychiatric: He has a normal mood and affect. His speech is normal and behavior is normal. Judgment normal.       Assessment:       1. Pure hypercholesterolemia    2. Chronic obstructive pulmonary disease, unspecified COPD type    3. Essential hypertension    4. Benign prostatic hyperplasia without lower urinary tract symptoms    5. Prediabetes    6. History of cardioembolic cerebrovascular accident (CVA)    7. Prostate cancer screening        Plan:       PROBLEM LIST     Jamal was seen today for follow-up.    Diagnoses and all orders for this visit:    Pure hypercholesterolemia  -     CBC auto differential; Future  -     Comprehensive metabolic panel; Future  -     Lipid panel; Future  -     Hemoglobin A1c; Future  -     CBC auto differential  -     Comprehensive metabolic panel  -     Lipid panel  -     Hemoglobin A1c    Chronic obstructive pulmonary disease, unspecified COPD type    Essential hypertension    Benign prostatic hyperplasia without lower urinary tract symptoms    Prediabetes    History of cardioembolic cerebrovascular accident (CVA)  -     clopidogrel (PLAVIX) 75 mg tablet; Take 1 tablet (75 mg total) by mouth once daily.    Prostate cancer screening  -     PSA; Future  -     PSA    BP at goal. Compliant w/ all his meds. UTD w/ vaccine shcedule. Will renew his Plavix. RTC in 5 mo for routine checkup. Have labs completed one week prior.

## 2020-02-04 ENCOUNTER — OFFICE VISIT (OUTPATIENT)
Dept: FAMILY MEDICINE | Facility: CLINIC | Age: 65
End: 2020-02-04
Payer: COMMERCIAL

## 2020-02-04 VITALS
TEMPERATURE: 98 F | HEIGHT: 71 IN | RESPIRATION RATE: 16 BRPM | DIASTOLIC BLOOD PRESSURE: 74 MMHG | SYSTOLIC BLOOD PRESSURE: 124 MMHG | WEIGHT: 245.38 LBS | HEART RATE: 56 BPM | BODY MASS INDEX: 34.35 KG/M2

## 2020-02-04 DIAGNOSIS — N40.0 BENIGN PROSTATIC HYPERPLASIA WITHOUT LOWER URINARY TRACT SYMPTOMS: ICD-10-CM

## 2020-02-04 DIAGNOSIS — I10 ESSENTIAL HYPERTENSION: ICD-10-CM

## 2020-02-04 DIAGNOSIS — J06.9 UPPER RESPIRATORY TRACT INFECTION, UNSPECIFIED TYPE: ICD-10-CM

## 2020-02-04 DIAGNOSIS — J44.1 ACUTE EXACERBATION OF CHRONIC OBSTRUCTIVE PULMONARY DISEASE (COPD): Primary | ICD-10-CM

## 2020-02-04 DIAGNOSIS — E78.00 PURE HYPERCHOLESTEROLEMIA: ICD-10-CM

## 2020-02-04 DIAGNOSIS — J06.9 UPPER RESPIRATORY TRACT INFECTION, UNSPECIFIED TYPE: Primary | ICD-10-CM

## 2020-02-04 DIAGNOSIS — J44.1 ACUTE EXACERBATION OF CHRONIC OBSTRUCTIVE PULMONARY DISEASE (COPD): ICD-10-CM

## 2020-02-04 DIAGNOSIS — G47.33 OSA (OBSTRUCTIVE SLEEP APNEA): ICD-10-CM

## 2020-02-04 DIAGNOSIS — Z86.73 HISTORY OF CARDIOEMBOLIC CEREBROVASCULAR ACCIDENT (CVA): ICD-10-CM

## 2020-02-04 PROCEDURE — 3074F PR MOST RECENT SYSTOLIC BLOOD PRESSURE < 130 MM HG: ICD-10-PCS | Mod: CPTII,S$GLB,, | Performed by: NURSE PRACTITIONER

## 2020-02-04 PROCEDURE — 1101F PT FALLS ASSESS-DOCD LE1/YR: CPT | Mod: CPTII,S$GLB,, | Performed by: NURSE PRACTITIONER

## 2020-02-04 PROCEDURE — 99214 OFFICE O/P EST MOD 30 MIN: CPT | Mod: S$GLB,,, | Performed by: NURSE PRACTITIONER

## 2020-02-04 PROCEDURE — 3078F DIAST BP <80 MM HG: CPT | Mod: CPTII,S$GLB,, | Performed by: NURSE PRACTITIONER

## 2020-02-04 PROCEDURE — 3078F PR MOST RECENT DIASTOLIC BLOOD PRESSURE < 80 MM HG: ICD-10-PCS | Mod: CPTII,S$GLB,, | Performed by: NURSE PRACTITIONER

## 2020-02-04 PROCEDURE — 1101F PR PT FALLS ASSESS DOC 0-1 FALLS W/OUT INJ PAST YR: ICD-10-PCS | Mod: CPTII,S$GLB,, | Performed by: NURSE PRACTITIONER

## 2020-02-04 PROCEDURE — 1159F MED LIST DOCD IN RCRD: CPT | Mod: S$GLB,,, | Performed by: NURSE PRACTITIONER

## 2020-02-04 PROCEDURE — 99214 PR OFFICE/OUTPT VISIT, EST, LEVL IV, 30-39 MIN: ICD-10-PCS | Mod: S$GLB,,, | Performed by: NURSE PRACTITIONER

## 2020-02-04 PROCEDURE — 3074F SYST BP LT 130 MM HG: CPT | Mod: CPTII,S$GLB,, | Performed by: NURSE PRACTITIONER

## 2020-02-04 PROCEDURE — 1159F PR MEDICATION LIST DOCUMENTED IN MEDICAL RECORD: ICD-10-PCS | Mod: S$GLB,,, | Performed by: NURSE PRACTITIONER

## 2020-02-04 RX ORDER — AMOXICILLIN AND CLAVULANATE POTASSIUM 875; 125 MG/1; MG/1
1 TABLET, FILM COATED ORAL EVERY 12 HOURS
Qty: 20 TABLET | Refills: 0 | Status: SHIPPED | OUTPATIENT
Start: 2020-02-04 | End: 2020-09-28

## 2020-02-04 RX ORDER — PROMETHAZINE HYDROCHLORIDE AND DEXTROMETHORPHAN HYDROBROMIDE 6.25; 15 MG/5ML; MG/5ML
5 SYRUP ORAL EVERY 4 HOURS PRN
Qty: 240 ML | Refills: 0 | Status: SHIPPED | OUTPATIENT
Start: 2020-02-04 | End: 2020-02-14

## 2020-02-04 RX ORDER — PROMETHAZINE HYDROCHLORIDE AND DEXTROMETHORPHAN HYDROBROMIDE 6.25; 15 MG/5ML; MG/5ML
5 SYRUP ORAL EVERY 4 HOURS PRN
Qty: 240 ML | Refills: 0 | Status: SHIPPED | OUTPATIENT
Start: 2020-02-04 | End: 2020-02-04

## 2020-02-04 RX ORDER — CODEINE PHOSPHATE AND GUAIFENESIN 10; 100 MG/5ML; MG/5ML
5 SOLUTION ORAL EVERY 4 HOURS PRN
Qty: 240 ML | Refills: 0 | Status: SHIPPED | OUTPATIENT
Start: 2020-02-04 | End: 2020-02-14

## 2020-02-04 RX ORDER — ATORVASTATIN CALCIUM 80 MG/1
80 TABLET, FILM COATED ORAL DAILY
Qty: 90 TABLET | Refills: 3 | Status: SHIPPED | OUTPATIENT
Start: 2020-02-04 | End: 2021-01-25

## 2020-02-04 RX ORDER — METHYLPREDNISOLONE 4 MG/1
TABLET ORAL
Qty: 21 TABLET | Refills: 0 | Status: SHIPPED | OUTPATIENT
Start: 2020-02-04 | End: 2020-09-28

## 2020-02-04 NOTE — PROGRESS NOTES
Subjective:       Patient ID: Jamal Luna is a 65 y.o. male.    Chief Complaint: Cough (Productive cough since the weekend mainly at night and morning, white in color. ) and Medication Discussion (Antibiotic given in the past for cough.)  hld, need refills of statin, HTN, BPH, CARRILLO f/u    HPI     Has COPD. Started coughing and minor wheezing starting last week, but has progressed in severity over the weekend. No sick contacts that he is aware of. Spitting up thick white phlegm. Some sinus pressure, but no overt sinus pain. Coughing/ wheezing worse in morning and evening hours. He is worried that he will have an exacerbation of his copd if he does not handle symptoms now.     Chronic Disease F/U  Reported by patient.     BPH well controlled; compliant with medications; no side effects; healthy diet and exercise     Hyperlipidemia well controlled; compliant with medications; no side effects; healthy diet and exercise     Hypertension well controlled; compliant with medications; no side effects; healthy diet and exercise     CARRILLO well controlled; compliant with CPAP; no side effects; healthy diet and exercise    Review of Systems   Constitutional: Negative for activity change, appetite change, chills, fatigue and fever.   HENT: Positive for congestion, postnasal drip, rhinorrhea and sinus pressure. Negative for sinus pain, sneezing and sore throat.    Respiratory: Positive for cough and wheezing. Negative for apnea, chest tightness and shortness of breath.    Cardiovascular: Negative for chest pain, palpitations and leg swelling.   Gastrointestinal: Negative for abdominal distention, abdominal pain, blood in stool, constipation, diarrhea and vomiting.   Genitourinary: Negative for difficulty urinating, dysuria, flank pain, frequency and urgency.   Musculoskeletal: Negative for arthralgias, back pain, gait problem, joint swelling and neck pain.   Skin: Negative for color change, pallor and rash.   Neurological:  Negative for dizziness, tremors, syncope, weakness, light-headedness and numbness.   Hematological: Negative for adenopathy. Does not bruise/bleed easily.   Psychiatric/Behavioral: Negative for agitation, confusion and sleep disturbance. The patient is not nervous/anxious.        Objective:      Physical Exam   Constitutional: He is oriented to person, place, and time. Vital signs are normal. He appears well-developed and well-nourished.   HENT:   Head: Normocephalic and atraumatic.   Right Ear: Tympanic membrane, external ear and ear canal normal.   Left Ear: Tympanic membrane, external ear and ear canal normal.   Nose: Mucosal edema and rhinorrhea present. Right sinus exhibits no maxillary sinus tenderness and no frontal sinus tenderness. Left sinus exhibits no maxillary sinus tenderness and no frontal sinus tenderness.   Mouth/Throat: Mucous membranes are normal. Posterior oropharyngeal edema and posterior oropharyngeal erythema present. No oropharyngeal exudate.   Eyes: Pupils are equal, round, and reactive to light. Conjunctivae are normal.   Neck: Trachea normal and normal range of motion. Neck supple. Carotid bruit is not present.   Cardiovascular: Normal rate, regular rhythm and normal heart sounds.   Pulmonary/Chest: Effort normal. He has decreased breath sounds in the right upper field and the right middle field.   Abdominal: Soft. Bowel sounds are normal.   Musculoskeletal: Normal range of motion.   Neurological: He is alert and oriented to person, place, and time.   Skin: Skin is warm, dry and intact.   Psychiatric: He has a normal mood and affect. His speech is normal and behavior is normal. Judgment normal.       Assessment:       1. Acute exacerbation of chronic obstructive pulmonary disease (COPD)    2. Upper respiratory tract infection, unspecified type    3. Pure hypercholesterolemia    4. Essential hypertension    5. Benign prostatic hyperplasia without lower urinary tract symptoms    6. CARRILLO  (obstructive sleep apnea)    7. History of cardioembolic cerebrovascular accident (CVA)        Plan:       PROBLEM LIST     Jamal was seen today for cough and medication discussion.    Diagnoses and all orders for this visit:    Acute exacerbation of chronic obstructive pulmonary disease (COPD)  Comments:  mild exacerbation  Medications as directed, call or RTC for persistent or worsening symptoms    Upper respiratory tract infection, unspecified type  -     amoxicillin-clavulanate 875-125mg (AUGMENTIN) 875-125 mg per tablet; Take 1 tablet by mouth every 12 (twelve) hours.  -     methylPREDNISolone (MEDROL DOSEPACK) 4 mg tablet; use as directed  -     guaifenesin-codeine 100-10 mg/5 ml (CHERATUSSIN AC)  mg/5 mL syrup; Take 5 mLs by mouth every 4 (four) hours as needed for Cough.    Pure hypercholesterolemia  Compliant w/ statin therapy, renewing    Essential hypertension  At goal, continue current meds    Benign prostatic hyperplasia without lower urinary tract symptoms  Stable    CARRILLO (obstructive sleep apnea)  Compliant w/ cpap therapy    History of cardioembolic cerebrovascular accident (CVA)  -     atorvastatin (LIPITOR) 80 MG tablet; Take 1 tablet (80 mg total) by mouth once daily.

## 2020-05-07 LAB
ALBUMIN SERPL-MCNC: 3.9 G/DL (ref 3.6–5.1)
ALBUMIN/GLOB SERPL: 1.6 (CALC) (ref 1–2.5)
ALP SERPL-CCNC: 80 U/L (ref 35–144)
ALT SERPL-CCNC: 25 U/L (ref 9–46)
AST SERPL-CCNC: 21 U/L (ref 10–35)
BASOPHILS # BLD AUTO: 60 CELLS/UL (ref 0–200)
BASOPHILS NFR BLD AUTO: 1 %
BILIRUB SERPL-MCNC: 0.6 MG/DL (ref 0.2–1.2)
BUN SERPL-MCNC: 16 MG/DL (ref 7–25)
BUN/CREAT SERPL: ABNORMAL (CALC) (ref 6–22)
CALCIUM SERPL-MCNC: 9.6 MG/DL (ref 8.6–10.3)
CHLORIDE SERPL-SCNC: 104 MMOL/L (ref 98–110)
CHOLEST SERPL-MCNC: 99 MG/DL
CHOLEST/HDLC SERPL: 3 (CALC)
CO2 SERPL-SCNC: 30 MMOL/L (ref 20–32)
CREAT SERPL-MCNC: 0.95 MG/DL (ref 0.7–1.25)
EOSINOPHIL # BLD AUTO: 162 CELLS/UL (ref 15–500)
EOSINOPHIL NFR BLD AUTO: 2.7 %
ERYTHROCYTE [DISTWIDTH] IN BLOOD BY AUTOMATED COUNT: 13.2 % (ref 11–15)
GFRSERPLBLD MDRD-ARVRAT: 82 ML/MIN/1.73M2
GLOBULIN SER CALC-MCNC: 2.4 G/DL (CALC) (ref 1.9–3.7)
GLUCOSE SERPL-MCNC: 107 MG/DL (ref 65–99)
HBA1C MFR BLD: 6 % OF TOTAL HGB
HCT VFR BLD AUTO: 43.8 % (ref 38.5–50)
HDLC SERPL-MCNC: 33 MG/DL
HGB BLD-MCNC: 14 G/DL (ref 13.2–17.1)
LDLC SERPL CALC-MCNC: 50 MG/DL (CALC)
LYMPHOCYTES # BLD AUTO: 942 CELLS/UL (ref 850–3900)
LYMPHOCYTES NFR BLD AUTO: 15.7 %
MCH RBC QN AUTO: 30.1 PG (ref 27–33)
MCHC RBC AUTO-ENTMCNC: 32 G/DL (ref 32–36)
MCV RBC AUTO: 94.2 FL (ref 80–100)
MONOCYTES # BLD AUTO: 588 CELLS/UL (ref 200–950)
MONOCYTES NFR BLD AUTO: 9.8 %
NEUTROPHILS # BLD AUTO: 4248 CELLS/UL (ref 1500–7800)
NEUTROPHILS NFR BLD AUTO: 70.8 %
NONHDLC SERPL-MCNC: 66 MG/DL (CALC)
PLATELET # BLD AUTO: 191 THOUSAND/UL (ref 140–400)
PMV BLD REES-ECKER: 13.1 FL (ref 7.5–12.5)
POTASSIUM SERPL-SCNC: 4.5 MMOL/L (ref 3.5–5.3)
PROT SERPL-MCNC: 6.3 G/DL (ref 6.1–8.1)
PSA SERPL-MCNC: 1.8 NG/ML
RBC # BLD AUTO: 4.65 MILLION/UL (ref 4.2–5.8)
SODIUM SERPL-SCNC: 140 MMOL/L (ref 135–146)
TRIGL SERPL-MCNC: 78 MG/DL
WBC # BLD AUTO: 6 THOUSAND/UL (ref 3.8–10.8)

## 2020-05-29 ENCOUNTER — TELEPHONE (OUTPATIENT)
Dept: FAMILY MEDICINE | Facility: CLINIC | Age: 65
End: 2020-05-29

## 2020-06-17 ENCOUNTER — OFFICE VISIT (OUTPATIENT)
Dept: FAMILY MEDICINE | Facility: CLINIC | Age: 65
End: 2020-06-17
Payer: COMMERCIAL

## 2020-06-17 VITALS
OXYGEN SATURATION: 98 % | DIASTOLIC BLOOD PRESSURE: 68 MMHG | HEART RATE: 61 BPM | BODY MASS INDEX: 33.57 KG/M2 | HEIGHT: 71 IN | SYSTOLIC BLOOD PRESSURE: 123 MMHG | TEMPERATURE: 97 F | WEIGHT: 239.81 LBS

## 2020-06-17 DIAGNOSIS — N52.9 ERECTILE DYSFUNCTION, UNSPECIFIED ERECTILE DYSFUNCTION TYPE: Primary | Chronic | ICD-10-CM

## 2020-06-17 DIAGNOSIS — Z86.19 HISTORY OF SHINGLES: Chronic | ICD-10-CM

## 2020-06-17 DIAGNOSIS — Z71.2 ENCOUNTER TO DISCUSS TEST RESULTS: Chronic | ICD-10-CM

## 2020-06-17 DIAGNOSIS — R73.03 PREDIABETES: Chronic | ICD-10-CM

## 2020-06-17 DIAGNOSIS — Z86.73 HISTORY OF CARDIOEMBOLIC CEREBROVASCULAR ACCIDENT (CVA): Chronic | ICD-10-CM

## 2020-06-17 PROCEDURE — 3078F PR MOST RECENT DIASTOLIC BLOOD PRESSURE < 80 MM HG: ICD-10-PCS | Mod: CPTII,S$GLB,, | Performed by: FAMILY MEDICINE

## 2020-06-17 PROCEDURE — 3078F DIAST BP <80 MM HG: CPT | Mod: CPTII,S$GLB,, | Performed by: FAMILY MEDICINE

## 2020-06-17 PROCEDURE — 3074F PR MOST RECENT SYSTOLIC BLOOD PRESSURE < 130 MM HG: ICD-10-PCS | Mod: CPTII,S$GLB,, | Performed by: FAMILY MEDICINE

## 2020-06-17 PROCEDURE — 99214 OFFICE O/P EST MOD 30 MIN: CPT | Mod: S$GLB,,, | Performed by: FAMILY MEDICINE

## 2020-06-17 PROCEDURE — 1101F PT FALLS ASSESS-DOCD LE1/YR: CPT | Mod: CPTII,S$GLB,, | Performed by: FAMILY MEDICINE

## 2020-06-17 PROCEDURE — 1159F PR MEDICATION LIST DOCUMENTED IN MEDICAL RECORD: ICD-10-PCS | Mod: S$GLB,,, | Performed by: FAMILY MEDICINE

## 2020-06-17 PROCEDURE — 3074F SYST BP LT 130 MM HG: CPT | Mod: CPTII,S$GLB,, | Performed by: FAMILY MEDICINE

## 2020-06-17 PROCEDURE — 99214 PR OFFICE/OUTPT VISIT, EST, LEVL IV, 30-39 MIN: ICD-10-PCS | Mod: S$GLB,,, | Performed by: FAMILY MEDICINE

## 2020-06-17 PROCEDURE — 1159F MED LIST DOCD IN RCRD: CPT | Mod: S$GLB,,, | Performed by: FAMILY MEDICINE

## 2020-06-17 PROCEDURE — 1101F PR PT FALLS ASSESS DOC 0-1 FALLS W/OUT INJ PAST YR: ICD-10-PCS | Mod: CPTII,S$GLB,, | Performed by: FAMILY MEDICINE

## 2020-06-17 RX ORDER — SILDENAFIL 100 MG/1
100 TABLET, FILM COATED ORAL DAILY PRN
Qty: 30 TABLET | Refills: 1 | Status: SHIPPED | OUTPATIENT
Start: 2020-06-17

## 2020-06-17 NOTE — PATIENT INSTRUCTIONS
Call your insurance and ask what diagnosis code we can use for your to get physical therapy and what else needs to get done so you qualify for your back pain

## 2020-06-17 NOTE — PROGRESS NOTES
Subjective:       Patient ID: Jamal Luna is a 65 y.o. male.    Chief Complaint: Follow-up (discuss labs/ discuss a new medication/ shingles shot. )    66 yo M here for ED, back pain and his chronic dz.  Back pain: pt got some medication from his doctor a long time ago which helped him. He wanted to know what I can give him. He cannot remember what it was and he does not recognize any of the medications I tell him such as ketorolac or other NSAIDs.   ED: pt would like to have viagra refilled. He can tolerated this meds very well. He is compliant and he is out now.  Pt had a stroke in 2019 and he is on plavix and pt has some echymoses under his skin. He states that he gets bruised very easily.   Shingles: pt had the shingles last year in July and next month he would qualify for a zoster shot. However we do not have almost any vaccinations in since our fridge had broken down. Explained that he could go to a pharmacy for the shot and maybe even get the shingrix which is a 2-part vaccination.   We also discuss the last lab which shows that his prediabetic has improved and now his A1C is 6.0% (down from 6.4% last measure). Pt has not used any meds in the interrim.     Review of Systems   Constitutional: Negative for chills, fatigue and fever.   HENT: Negative for congestion, drooling, sneezing and sore throat.    Eyes: Negative for pain and visual disturbance.   Respiratory: Negative for cough and shortness of breath.    Cardiovascular: Negative for chest pain.   Gastrointestinal: Negative for abdominal pain, constipation, diarrhea and nausea.   Endocrine: Negative for cold intolerance and heat intolerance.   Genitourinary: Negative for difficulty urinating and frequency.   Musculoskeletal: Negative for myalgias.   Allergic/Immunologic: Negative for food allergies.   Neurological: Negative for seizures and headaches.   Psychiatric/Behavioral: Negative for behavioral problems.       Objective:      Physical  Exam  Vitals signs and nursing note reviewed.   Constitutional:       Appearance: He is well-developed.   HENT:      Head: Normocephalic.      Right Ear: External ear normal.      Left Ear: External ear normal.      Nose: Nose normal.   Eyes:      Extraocular Movements: Extraocular movements intact.      Conjunctiva/sclera: Conjunctivae normal.   Neck:      Musculoskeletal: Normal range of motion.   Cardiovascular:      Rate and Rhythm: Normal rate and regular rhythm.   Pulmonary:      Effort: Pulmonary effort is normal. No respiratory distress.      Breath sounds: Normal breath sounds. No wheezing.   Abdominal:      Palpations: Abdomen is soft.   Musculoskeletal: Normal range of motion.         General: No swelling or deformity.   Skin:     General: Skin is warm.      Capillary Refill: Capillary refill takes less than 2 seconds.      Findings: Bruising present.   Neurological:      General: No focal deficit present.      Mental Status: He is alert.   Psychiatric:         Mood and Affect: Mood normal.         Assessment:       1. Erectile dysfunction, unspecified erectile dysfunction type    2. History of shingles    3. Encounter to discuss test results    4. Prediabetes Stable   5. History of cardioembolic cerebrovascular accident (CVA) Stable       Plan:       PROBLEM LIST     Jamal was seen today for follow-up.    Diagnoses and all orders for this visit:    Erectile dysfunction, unspecified erectile dysfunction type  Comments:  viagra / sildenafil refilled  Orders:  -     sildenafiL (VIAGRA) 100 MG tablet; Take 1 tablet (100 mg total) by mouth daily as needed for Erectile Dysfunction.    History of shingles  Comments:  pt would like the shingles shot    Encounter to discuss test results  Comments:  prediabetes    Prediabetes  Comments:  improved, a1c=6.0%     History of cardioembolic cerebrovascular accident (CVA)  Comments:  continue plavix

## 2020-06-19 DIAGNOSIS — N52.9 ERECTILE DYSFUNCTION, UNSPECIFIED ERECTILE DYSFUNCTION TYPE: Chronic | ICD-10-CM

## 2020-06-20 RX ORDER — SILDENAFIL 100 MG/1
TABLET, FILM COATED ORAL
Qty: 30 TABLET | Refills: 1 | OUTPATIENT
Start: 2020-06-20

## 2020-08-14 ENCOUNTER — TELEPHONE (OUTPATIENT)
Dept: FAMILY MEDICINE | Facility: CLINIC | Age: 65
End: 2020-08-14

## 2020-08-14 NOTE — TELEPHONE ENCOUNTER
----- Message from Yvonne Quiroz sent at 8/14/2020 10:20 AM CDT -----  patient need to speak to nurse regarding medication. Call back number [phone]. Jaxs

## 2020-08-14 NOTE — TELEPHONE ENCOUNTER
Mariela called us back and I let her know that the pt will have to be cleared by a cardiologist to be taken off the blood thinner. Dr. Concepcion does not want to take him off.

## 2020-08-14 NOTE — TELEPHONE ENCOUNTER
Called pt. Left message letting him know that ximena would sign off on him being off the plavix for 10days for surgery, and that if he needed anything else to give us a call.

## 2020-08-14 NOTE — TELEPHONE ENCOUNTER
----- Message from Hua Gee sent at 8/14/2020  8:37 AM CDT -----  Regarding: Blood thinner question  Please call Mariela with Dr. Cristian Mendoza's office to discuss this patient's blood thinner medication [phone].

## 2020-09-28 ENCOUNTER — OFFICE VISIT (OUTPATIENT)
Dept: FAMILY MEDICINE | Facility: CLINIC | Age: 65
End: 2020-09-28
Payer: COMMERCIAL

## 2020-09-28 VITALS
WEIGHT: 231 LBS | TEMPERATURE: 98 F | DIASTOLIC BLOOD PRESSURE: 80 MMHG | BODY MASS INDEX: 32.34 KG/M2 | OXYGEN SATURATION: 98 % | HEART RATE: 66 BPM | HEIGHT: 71 IN | SYSTOLIC BLOOD PRESSURE: 141 MMHG

## 2020-09-28 DIAGNOSIS — I10 ESSENTIAL HYPERTENSION: ICD-10-CM

## 2020-09-28 DIAGNOSIS — E78.00 PURE HYPERCHOLESTEROLEMIA: ICD-10-CM

## 2020-09-28 DIAGNOSIS — N40.0 BENIGN PROSTATIC HYPERPLASIA WITHOUT LOWER URINARY TRACT SYMPTOMS: ICD-10-CM

## 2020-09-28 DIAGNOSIS — J44.9 CHRONIC OBSTRUCTIVE PULMONARY DISEASE, UNSPECIFIED COPD TYPE: ICD-10-CM

## 2020-09-28 DIAGNOSIS — Z01.818 PREOPERATIVE CLEARANCE: Primary | ICD-10-CM

## 2020-09-28 DIAGNOSIS — N52.9 ERECTILE DYSFUNCTION, UNSPECIFIED ERECTILE DYSFUNCTION TYPE: ICD-10-CM

## 2020-09-28 PROCEDURE — 3008F BODY MASS INDEX DOCD: CPT | Mod: CPTII,S$GLB,, | Performed by: NURSE PRACTITIONER

## 2020-09-28 PROCEDURE — 3079F DIAST BP 80-89 MM HG: CPT | Mod: CPTII,S$GLB,, | Performed by: NURSE PRACTITIONER

## 2020-09-28 PROCEDURE — 3008F PR BODY MASS INDEX (BMI) DOCUMENTED: ICD-10-PCS | Mod: CPTII,S$GLB,, | Performed by: NURSE PRACTITIONER

## 2020-09-28 PROCEDURE — 3079F PR MOST RECENT DIASTOLIC BLOOD PRESSURE 80-89 MM HG: ICD-10-PCS | Mod: CPTII,S$GLB,, | Performed by: NURSE PRACTITIONER

## 2020-09-28 PROCEDURE — 99214 PR OFFICE/OUTPT VISIT, EST, LEVL IV, 30-39 MIN: ICD-10-PCS | Mod: S$GLB,,, | Performed by: NURSE PRACTITIONER

## 2020-09-28 PROCEDURE — 1159F MED LIST DOCD IN RCRD: CPT | Mod: S$GLB,,, | Performed by: NURSE PRACTITIONER

## 2020-09-28 PROCEDURE — 3077F SYST BP >= 140 MM HG: CPT | Mod: CPTII,S$GLB,, | Performed by: NURSE PRACTITIONER

## 2020-09-28 PROCEDURE — 3077F PR MOST RECENT SYSTOLIC BLOOD PRESSURE >= 140 MM HG: ICD-10-PCS | Mod: CPTII,S$GLB,, | Performed by: NURSE PRACTITIONER

## 2020-09-28 PROCEDURE — 1159F PR MEDICATION LIST DOCUMENTED IN MEDICAL RECORD: ICD-10-PCS | Mod: S$GLB,,, | Performed by: NURSE PRACTITIONER

## 2020-09-28 PROCEDURE — 99214 OFFICE O/P EST MOD 30 MIN: CPT | Mod: S$GLB,,, | Performed by: NURSE PRACTITIONER

## 2020-09-28 NOTE — PROGRESS NOTES
Subjective:       Patient ID: Jamal Luna is a 65 y.o. male.    Chief Complaint: stomach problems; needs pre-op clearance     HPI   Tentatively, he is having surgery pending preoperative clearance w/ Dr Tono Cole at [facility] in [place]. He is having a UroLift to address his BPH symptoms. He has been feeling well in general. No recent illness. No SARS-CoV-2 exposure. No fevers. No CP or palpitations. No weakness or fatigue. No upper or lower respiratory tract infections. No N/V/Abd pain. Currently on daily clopidogrel.       Review of Systems   Constitutional: Negative for chills, fatigue and fever.   HENT: Negative for congestion, drooling, sneezing and sore throat.    Eyes: Negative for pain and visual disturbance.   Respiratory: Negative for cough and shortness of breath.    Cardiovascular: Negative for chest pain.   Gastrointestinal: Negative for abdominal pain, constipation, diarrhea and nausea.   Endocrine: Negative for cold intolerance and heat intolerance.   Genitourinary: Negative for difficulty urinating and frequency.   Musculoskeletal: Negative for myalgias.   Allergic/Immunologic: Negative for food allergies.   Neurological: Negative for seizures and headaches.   Psychiatric/Behavioral: Negative for behavioral problems.           Past Medical History:  Past Medical History:   Diagnosis Date    COPD (chronic obstructive pulmonary disease)     Erectile dysfunction     Hyperlipidemia     Hypertension     Stroke 07/2019      History reviewed. No pertinent surgical history.   Review of patient's allergies indicates:  No Known Allergies   Current Outpatient Medications   Medication Sig Dispense Refill    alfuzosin (UROXATRAL) 10 mg Tb24 alfuzosin ER 10 mg tablet,extended release 24 hr      atorvastatin (LIPITOR) 80 MG tablet Take 1 tablet (80 mg total) by mouth once daily. 90 tablet 3    clopidogrel (PLAVIX) 75 mg tablet Take 1 tablet (75 mg total) by mouth once daily. 90  tablet 3    fish oil-omega-3 fatty acids 300-1,000 mg capsule Take 1 capsule by mouth once a week.      multivitamin capsule Take 1 capsule by mouth once daily.      polyethylene glycol 3350 (MIRALAX ORAL) Take 1 Dose by mouth 2 (two) times daily.      sildenafiL (VIAGRA) 100 MG tablet Take 1 tablet (100 mg total) by mouth daily as needed for Erectile Dysfunction. 30 tablet 1    umeclidinium bromide (INCRUSE ELLIPTA INHL) Inhale 1 puff into the lungs once daily.       No current facility-administered medications for this visit.      Social History     Socioeconomic History    Marital status:      Spouse name: Not on file    Number of children: Not on file    Years of education: Not on file    Highest education level: Not on file   Occupational History    Occupation: Retired    Social Needs    Financial resource strain: Not on file    Food insecurity     Worry: Not on file     Inability: Not on file    Transportation needs     Medical: Not on file     Non-medical: Not on file   Tobacco Use    Smoking status: Never Smoker    Smokeless tobacco: Never Used   Substance and Sexual Activity    Alcohol use: Yes     Frequency: Never     Comment: social drinker    Drug use: No    Sexual activity: Not on file   Lifestyle    Physical activity     Days per week: Not on file     Minutes per session: Not on file    Stress: Only a little   Relationships    Social connections     Talks on phone: Not on file     Gets together: Not on file     Attends [employer] service: Not on file     Active member of club or organization: Not on file     Attends meetings of clubs or organizations: Not on file     Relationship status: Not on file   Other Topics Concern    Not on file   Social History Narrative    Not on file      Family History   Problem Relation Age of Onset    Heart disease Father     Heart disease Sister     Heart disease Brother         Objective:      Physical  Exam  Constitutional:       Appearance: He is well-developed.   HENT:      Head: Normocephalic and atraumatic.   Eyes:      Conjunctiva/sclera: Conjunctivae normal.      Pupils: Pupils are equal, round, and reactive to light.   Neck:      Musculoskeletal: Normal range of motion and neck supple.      Vascular: No carotid bruit.      Trachea: Trachea normal.   Cardiovascular:      Rate and Rhythm: Normal rate and regular rhythm.      Heart sounds: Normal heart sounds.   Pulmonary:      Effort: Pulmonary effort is normal.      Breath sounds: Normal breath sounds.   Abdominal:      General: Bowel sounds are normal.      Palpations: Abdomen is soft.   Musculoskeletal: Normal range of motion.   Skin:     General: Skin is warm and dry.   Neurological:      Mental Status: He is alert and oriented to person, place, and time.   Psychiatric:         Speech: Speech normal.         Behavior: Behavior normal.         Judgment: Judgment normal.         Assessment:       1. Preoperative clearance    2. Benign prostatic hyperplasia without lower urinary tract symptoms    3. Erectile dysfunction, unspecified erectile dysfunction type    4. Essential hypertension    5. Pure hypercholesterolemia    6. Chronic obstructive pulmonary disease, unspecified COPD type        Plan:       PROBLEM LIST     Jamal was seen today for consult and gastroesophageal reflux.    Diagnoses and all orders for this visit:    Preoperative clearance  -     CBC auto differential; Future  -     Comprehensive metabolic panel; Future  -     X-Ray Chest 1 View; Future  -     Urinalysis Complete  -     CBC auto differential   -     Comprehensive metabolic panel  -     X-Ray Chest 1 View  VSS, hemodynamically stable. Reviewed labs and imaging. No acute cardiopulmonary issues that would interfere w/ anesthesia. Cleared for surgery.  Instructed to hold clopidogrel five day prior to procedure. Can resume 5 days following the procedure.     Benign prostatic hyperplasia  without lower urinary tract symptoms    Erectile dysfunction, unspecified erectile dysfunction type    Essential hypertension  Chronic; controlled    Pure hypercholesterolemia  Chronic; compliant w/ statin regimen hs    Chronic obstructive pulmonary disease, unspecified COPD type  Stable w/ Incruse inhaler. No recent exacerbations.

## 2020-10-01 ENCOUNTER — TELEPHONE (OUTPATIENT)
Dept: FAMILY MEDICINE | Facility: CLINIC | Age: 65
End: 2020-10-01

## 2020-10-01 LAB
ABS NRBC COUNT: 0 THOU/UL (ref 0–0.01)
ABSOLUTE BASOPHIL: 0 10*3/UL (ref 0–0.3)
ABSOLUTE EOSINOPHIL: 0.2 10*3/UL (ref 0–0.6)
ABSOLUTE IMMATURE GRAN: 0.02 THOU/UL (ref 0–0.03)
ABSOLUTE LYMPHOCYTE: 1.1 10*3/UL (ref 1.2–4)
ABSOLUTE MONOCYTE: 0.7 10*3/UL (ref 0.1–0.8)
ALBUMIN SERPL BCP-MCNC: 3.8 G/DL (ref 3.4–5)
ALP SERPL-CCNC: 89 U/L (ref 45–117)
ALT SERPL W P-5'-P-CCNC: 43 U/L (ref 16–61)
ANION GAP SERPL CALC-SCNC: 5 MMOL/L (ref 3–11)
APPEARANCE, UA: CLEAR
AST SERPL-CCNC: 33 U/L (ref 15–37)
BACTERIA SPEC CULT: NORMAL /HPF
BASOPHILS NFR BLD: 0.6 % (ref 0–3)
BILIRUB SERPL-MCNC: 0.5 MG/DL (ref 0.2–1)
BILIRUB UR QL STRIP: NEGATIVE
BUN SERPL-MCNC: 13 MG/DL (ref 7–18)
CALCIUM SERPL-MCNC: 9 MG/DL (ref 8.5–10.1)
CHLORIDE SERPL-SCNC: 107 MMOL/L (ref 98–107)
CO2 SERPL-SCNC: 28 MMOL/L (ref 21–32)
COLOR UR: NORMAL
CREAT SERPL-MCNC: 0.91 MG/DL (ref 0.7–1.3)
EOSINOPHIL NFR BLD: 2.1 % (ref 0–6)
ERYTHROCYTE [DISTWIDTH] IN BLOOD BY AUTOMATED COUNT: 14.9 % (ref 0–15.5)
GFR ESTIMATION: > 60
GLUCOSE (UA): NEGATIVE MG/DL
GLUCOSE SERPL-MCNC: 101 MG/DL (ref 74–106)
HCT VFR BLD AUTO: 43.5 % (ref 42–52)
HGB BLD-MCNC: 13.9 G/DL (ref 14–18)
HGB UR QL STRIP: NEGATIVE
IMMATURE GRANULOCYTES: 0.3 % (ref 0–0.43)
KETONES UR QL STRIP: NEGATIVE MG/DL
LEUKOCYTE ESTERASE UR QL STRIP: NEGATIVE LEU/UL
LYMPHOCYTES NFR BLD: 15.4 % (ref 20–45)
MCH RBC QN AUTO: 30.4 PG (ref 27–32)
MCHC RBC AUTO-ENTMCNC: 32 % (ref 32–36)
MCV RBC AUTO: 95.2 FL (ref 80–97)
MONOCYTES NFR BLD: 10.2 % (ref 2–10)
MUCUS URINE: NORMAL /LPF
NEUTROPHILS # BLD AUTO: 5.1 10*3/UL (ref 1.4–7)
NEUTROPHILS NFR BLD: 71.4 % (ref 50–80)
NITRITE UR QL STRIP: NEGATIVE
NUCLEATED RED BLOOD CELLS: 0 % (ref 0–0.2)
PH UR STRIP: 6.5 PH (ref 5–8)
PLATELETS: 166 10*3/UL (ref 130–400)
PMV BLD AUTO: 12.2 FL (ref 9.2–12.2)
POTASSIUM SERPL-SCNC: 4.3 MMOL/L (ref 3.5–5.1)
PROT SERPL-MCNC: 6.9 G/DL (ref 6.4–8.2)
PROT UR QL STRIP: NEGATIVE MG/DL
RBC # BLD AUTO: 4.57 10*6/UL (ref 4.7–6.1)
RBC #/AREA URNS HPF: NORMAL /HPF (ref 0–2)
SERVICE COMMENT 03: NORMAL
SODIUM BLD-SCNC: 140 MMOL/L (ref 131–143)
SP GR UR STRIP: 1.01 (ref 1–1.03)
SQUAMOUS EPITHELIAL, UA: NORMAL /LPF
UROBILINOGEN UR STRIP-ACNC: NORMAL MG/DL
WBC # BLD: 7.1 10*3/UL (ref 4.5–10)
WBC #/AREA URNS HPF: NORMAL /HPF (ref 0–2)

## 2020-10-16 ENCOUNTER — TELEPHONE (OUTPATIENT)
Dept: FAMILY MEDICINE | Facility: CLINIC | Age: 65
End: 2020-10-16

## 2020-10-16 NOTE — TELEPHONE ENCOUNTER
----- Message from Yvonne Quiroz sent at 10/16/2020 12:40 PM CDT -----  Patient need to speak to nurse regarding paperwork completion. Call back number [phone]. Tks

## 2020-10-20 ENCOUNTER — OFFICE VISIT (OUTPATIENT)
Dept: FAMILY MEDICINE | Facility: CLINIC | Age: 65
End: 2020-10-20
Payer: MEDICARE

## 2020-10-20 VITALS
WEIGHT: 236.81 LBS | HEART RATE: 60 BPM | SYSTOLIC BLOOD PRESSURE: 130 MMHG | HEIGHT: 71 IN | OXYGEN SATURATION: 97 % | BODY MASS INDEX: 33.15 KG/M2 | TEMPERATURE: 98 F | DIASTOLIC BLOOD PRESSURE: 69 MMHG

## 2020-10-20 DIAGNOSIS — Z01.818 PREOPERATIVE CLEARANCE: Primary | ICD-10-CM

## 2020-10-20 DIAGNOSIS — H25.9 AGE-RELATED CATARACT OF BOTH EYES, UNSPECIFIED AGE-RELATED CATARACT TYPE: ICD-10-CM

## 2020-10-20 DIAGNOSIS — Z23 FLU VACCINE NEED: ICD-10-CM

## 2020-10-20 PROCEDURE — 3008F BODY MASS INDEX DOCD: CPT | Mod: CPTII,S$GLB,, | Performed by: NURSE PRACTITIONER

## 2020-10-20 PROCEDURE — 1159F PR MEDICATION LIST DOCUMENTED IN MEDICAL RECORD: ICD-10-PCS | Mod: S$GLB,,, | Performed by: NURSE PRACTITIONER

## 2020-10-20 PROCEDURE — 3075F SYST BP GE 130 - 139MM HG: CPT | Mod: CPTII,S$GLB,, | Performed by: NURSE PRACTITIONER

## 2020-10-20 PROCEDURE — 3078F DIAST BP <80 MM HG: CPT | Mod: CPTII,S$GLB,, | Performed by: NURSE PRACTITIONER

## 2020-10-20 PROCEDURE — 3078F PR MOST RECENT DIASTOLIC BLOOD PRESSURE < 80 MM HG: ICD-10-PCS | Mod: CPTII,S$GLB,, | Performed by: NURSE PRACTITIONER

## 2020-10-20 PROCEDURE — 3008F PR BODY MASS INDEX (BMI) DOCUMENTED: ICD-10-PCS | Mod: CPTII,S$GLB,, | Performed by: NURSE PRACTITIONER

## 2020-10-20 PROCEDURE — 90686 FLU VACCINE (QUAD) GREATER THAN OR EQUAL TO 3YO PRESERVATIVE FREE IM: ICD-10-PCS | Mod: S$GLB,,, | Performed by: NURSE PRACTITIONER

## 2020-10-20 PROCEDURE — 99213 OFFICE O/P EST LOW 20 MIN: CPT | Mod: 25,S$GLB,, | Performed by: NURSE PRACTITIONER

## 2020-10-20 PROCEDURE — 99213 PR OFFICE/OUTPT VISIT, EST, LEVL III, 20-29 MIN: ICD-10-PCS | Mod: 25,S$GLB,, | Performed by: NURSE PRACTITIONER

## 2020-10-20 PROCEDURE — G0008 FLU VACCINE (QUAD) GREATER THAN OR EQUAL TO 3YO PRESERVATIVE FREE IM: ICD-10-PCS | Mod: S$GLB,,, | Performed by: NURSE PRACTITIONER

## 2020-10-20 PROCEDURE — 3075F PR MOST RECENT SYSTOLIC BLOOD PRESS GE 130-139MM HG: ICD-10-PCS | Mod: CPTII,S$GLB,, | Performed by: NURSE PRACTITIONER

## 2020-10-20 PROCEDURE — 90686 IIV4 VACC NO PRSV 0.5 ML IM: CPT | Mod: S$GLB,,, | Performed by: NURSE PRACTITIONER

## 2020-10-20 PROCEDURE — G0008 ADMIN INFLUENZA VIRUS VAC: HCPCS | Mod: S$GLB,,, | Performed by: NURSE PRACTITIONER

## 2020-10-20 PROCEDURE — 1159F MED LIST DOCD IN RCRD: CPT | Mod: S$GLB,,, | Performed by: NURSE PRACTITIONER

## 2020-10-20 NOTE — PROGRESS NOTES
Subjective:       Patient ID: Jamal Luna is a 65 y.o. male.    Chief Complaint: stomach problems; needs pre-op clearance     HPI   Tentatively, he is scheduled for surgery with ophthalmologist Derrell Stewart. He is scheduled for right cataract extraction on 10/27/20 and left cataract extraction in November. He is requiring additional forms completed by his surgeon. He has been feeling well in general. No recent illness. No SARS-CoV-2 exposure. No fevers. No CP or palpitations. No weakness or fatigue. No upper or lower respiratory tract infections. No N/V/Abd pain. Currently on daily clopidogrel.       Review of Systems   Constitutional: Negative for chills, fatigue and fever.   HENT: Negative for congestion, drooling, sneezing and sore throat.    Eyes: Negative for pain and visual disturbance.   Respiratory: Negative for cough and shortness of breath.    Cardiovascular: Negative for chest pain.   Gastrointestinal: Negative for abdominal pain, constipation, diarrhea and nausea.   Endocrine: Negative for cold intolerance and heat intolerance.   Genitourinary: Negative for difficulty urinating and frequency.   Musculoskeletal: Negative for myalgias.   Allergic/Immunologic: Negative for food allergies.   Neurological: Negative for seizures and headaches.   Psychiatric/Behavioral: Negative for behavioral problems.           Past Medical History:  Past Medical History:   Diagnosis Date    COPD (chronic obstructive pulmonary disease)     Erectile dysfunction     Hyperlipidemia     Hypertension     Stroke 07/2019      History reviewed. No pertinent surgical history.   Review of patient's allergies indicates:  No Known Allergies   Current Outpatient Medications   Medication Sig Dispense Refill    alfuzosin (UROXATRAL) 10 mg Tb24 alfuzosin ER 10 mg tablet,extended release 24 hr      atorvastatin (LIPITOR) 80 MG tablet Take 1 tablet (80 mg total) by mouth once daily. 90 tablet 3    clopidogrel (PLAVIX) 75 mg tablet  Take 1 tablet (75 mg total) by mouth once daily. 90 tablet 3    fish oil-omega-3 fatty acids 300-1,000 mg capsule Take 1 capsule by mouth once a week.      multivitamin capsule Take 1 capsule by mouth once daily.      polyethylene glycol 3350 (MIRALAX ORAL) Take 1 Dose by mouth 2 (two) times daily.      sildenafiL (VIAGRA) 100 MG tablet Take 1 tablet (100 mg total) by mouth daily as needed for Erectile Dysfunction. 30 tablet 1    umeclidinium bromide (INCRUSE ELLIPTA INHL) Inhale 1 puff into the lungs once daily.       No current facility-administered medications for this visit.      Social History     Socioeconomic History    Marital status:      Spouse name: Not on file    Number of children: Not on file    Years of education: Not on file    Highest education level: Not on file   Occupational History    Occupation: Retired    Social Needs    Financial resource strain: Not on file    Food insecurity     Worry: Not on file     Inability: Not on file    Transportation needs     Medical: Not on file     Non-medical: Not on file   Tobacco Use    Smoking status: Never Smoker    Smokeless tobacco: Never Used   Substance and Sexual Activity    Alcohol use: Yes     Frequency: Never     Comment: social drinker    Drug use: No    Sexual activity: Not on file   Lifestyle    Physical activity     Days per week: Not on file     Minutes per session: Not on file    Stress: Only a little   Relationships    Social connections     Talks on phone: Not on file     Gets together: Not on file     Attends Denominational service: Not on file     Active member of club or organization: Not on file     Attends meetings of clubs or organizations: Not on file     Relationship status: Not on file   Other Topics Concern    Not on file   Social History Narrative    Not on file      Family History   Problem Relation Age of Onset    Heart disease Father     Heart disease Sister     Heart disease  Brother         Objective:      Physical Exam  Constitutional:       Appearance: He is well-developed.   HENT:      Head: Normocephalic and atraumatic.   Eyes:      Conjunctiva/sclera: Conjunctivae normal.      Pupils: Pupils are equal, round, and reactive to light.   Neck:      Musculoskeletal: Normal range of motion and neck supple.      Vascular: No carotid bruit.      Trachea: Trachea normal.   Cardiovascular:      Rate and Rhythm: Normal rate and regular rhythm.      Heart sounds: Normal heart sounds.   Pulmonary:      Effort: Pulmonary effort is normal.      Breath sounds: Normal breath sounds.   Abdominal:      General: Bowel sounds are normal.      Palpations: Abdomen is soft.   Musculoskeletal: Normal range of motion.   Skin:     General: Skin is warm and dry.   Neurological:      Mental Status: He is alert and oriented to person, place, and time.   Psychiatric:         Speech: Speech normal.         Behavior: Behavior normal.         Judgment: Judgment normal.         Assessment:       1. Preoperative clearance    2. Age-related cataract of both eyes, unspecified age-related cataract type    3. Flu vaccine need        Plan:       PROBLEM LIST     Jamal was seen today for consult and gastroesophageal reflux.    Diagnoses and all orders for this visit:    Preoperative clearance  -     CBC auto differential; Future  -     Comprehensive metabolic panel; Future  -     X-Ray Chest 1 View; Future  -     Urinalysis Complete  -     CBC auto differential   -     Comprehensive metabolic panel  -     X-Ray Chest 1 View  VSS, hemodynamically stable. Reviewed labs and imaging. No acute cardiopulmonary issues that would interfere w/ anesthesia. Cleared for surgery.  Instructed to hold clopidogrel five day prior to procedure. Can resume 5 days following the procedure.     Age-related cataract both eyes    Flu vaccine need  -Fluarix provided in clinic today.

## 2020-12-02 DIAGNOSIS — Z86.73 HISTORY OF CARDIOEMBOLIC CEREBROVASCULAR ACCIDENT (CVA): ICD-10-CM

## 2020-12-03 RX ORDER — CLOPIDOGREL BISULFATE 75 MG/1
75 TABLET ORAL DAILY
Qty: 90 TABLET | Refills: 1 | Status: SHIPPED | OUTPATIENT
Start: 2020-12-03 | End: 2021-01-25

## 2021-07-02 ENCOUNTER — TELEPHONE (OUTPATIENT)
Dept: FAMILY MEDICINE CLINIC | Age: 66
End: 2021-07-02

## 2021-11-01 ENCOUNTER — PATIENT OUTREACH (OUTPATIENT)
Dept: ADMINISTRATIVE | Facility: HOSPITAL | Age: 66
End: 2021-11-01
Payer: MEDICARE

## 2022-07-19 ENCOUNTER — PATIENT OUTREACH (OUTPATIENT)
Dept: ADMINISTRATIVE | Facility: HOSPITAL | Age: 67
End: 2022-07-19
Payer: MEDICARE

## 2022-07-19 NOTE — PROGRESS NOTES
Working Colonoscopy Report: Still Due     Manually uploaded and hyper linked Colonoscopy with Pathology to  from outside facility   Colonoscopy referral-None  GMED-2010 Colonoscopy, 2016 Colonoscopy with Pathology      Health Maintenance Updated  Immunizations Updated

## 2022-10-19 NOTE — PROGRESS NOTES
Pediatric Psychology Progress Note    Start time: 4:12 pm  Stop time: 4:51 pm     Service:  [id number] - Health behavior intervention, individual, initial 30 minutes  [id number] - Health behavior intervention, individual, each additional 15 minutes     Encounter Diagnoses   Name Primary?     Type 1 diabetes mellitus without complication (H) Yes     Body mass index, pediatric, greater than 99th percentile for age        Subjective: Destinee Zee is an 9-year-old female with a medical history significant for type 1 diabetes who sought therapy services for managing and coping with her mood and emotions. She is followed by her endocrinologist, Dr. Sirena Strauss.    Objective: Pediatric psychology intern, Leticia Carter, met with Destinee and her mother together at the beginning of session. Briefly reviewed CBT triangle and informed of outline of session. Checked in about Destinee due to having to put one of her family dogs down last week. She reported she is doing good as did her mother. Reviewed family and current clinician's schedule and scheduled a follow up visit. Met with Destinee individually. Psychoeducation on feelings/emotions and relaxation strategies. Destinee indicated breathing exercises are not typically helpful for her. Discussed examples provided in previous sessions in regards to feelings. Destinee described she has recently been using a stress ball and watching television shows when she completes chores, and this helps her feel less frustrated. Ended session with a brief online game for rapport building.     Assessment: Destinee was engaged in today's session. Destinee s affect appeared of normal range and variance. She warmed up easily and had reciprocal social interactions. Eye contact was appropriate. She responded to all the clinician's questions, suggesting adequate attention and concentration. She doodled during discussion. Destinee s speech had an average rate, normal volume, and appropriate tone with no evidence of articulation  Subjective:       Patient ID: Jamal Luna is a 64 y.o. male.    Chief Complaint: Follow-up (Pt stated that he still has some soreness & itchiness to the back of his head where he has had shingles. Pt also has a bump on his middle finger on his left had that also hurts & is extending to his nail bed.) and Hospital Follow Up (Pt went into [facility] for a stroke & was put on blood thinners. Pt has some bruising on both arms. Some scratches are in the healing stages. Pt still has some weakness on the right side.)    65 yo M here for f/u on hospital visit in [place] at [facility] for a stroke. Pt lives in [place] and he went to the hospital in [place] and he was transferred to [place]. Pt had right sided weakness and right sided mouth droop w/ slurred speech. Pt is doing much better now - face functions normally again his right sided weakness is still somewhat there but improved. Pt sees physical therapy. Pt is now on plavix and baby aspirin.      Pt stated that he still has some soreness & itchiness to the back of his head where he has had shingles.     Pt also has a bump on his middle finger on his left had that also hurts & is extending to his nail bed.      Review of Systems   Constitutional: Negative for chills, fatigue and fever.   HENT: Negative for congestion, drooling, sneezing and sore throat.    Eyes: Negative for pain and visual disturbance.   Respiratory: Negative for cough and shortness of breath.    Cardiovascular: Negative for chest pain.   Gastrointestinal: Negative for abdominal pain, constipation, diarrhea and nausea.   Endocrine: Negative for cold intolerance and heat intolerance.   Genitourinary: Negative for difficulty urinating and frequency.   Musculoskeletal: Negative for myalgias.   Skin:        Scalp ache over left side/occipital   Allergic/Immunologic: Negative for food allergies.   Neurological: Negative for seizures and headaches.   Psychiatric/Behavioral: Negative for behavioral  difficulties. Destinee wears glasses.    Plan: The next session will take place on Wednesday, 10/26 at 4 PM virtually.      Leticia Cartre MS  Pediatric Psychology Intern  Department of Pediatrics     Rosy Macias, PhD, LP, [facility]    of Pediatrics   Department of Pediatrics      I did not see this patient directly. This patient was discussed with me in individual therapy supervision, and I agree with the plan as documented.    Rosy Macias, Ph.D., L.P.  Department of Pediatrics  November 23, 2022      The author of this note documented a reason for not sharing it with the patient.    *No letter   problems.       Objective:      Physical Exam   Constitutional: He appears well-developed.   HENT:   Right Ear: External ear normal.   Left Ear: External ear normal.   Mouth/Throat: Oropharynx is clear and moist.   Eyes: Conjunctivae and EOM are normal.   Neck: Normal range of motion.   Cardiovascular: Normal rate, regular rhythm and intact distal pulses.   Pulmonary/Chest: Effort normal and breath sounds normal.   Abdominal: Soft.   Musculoskeletal: Normal range of motion.   Neurological: He is alert.   Skin: Skin is warm. Capillary refill takes less than 2 seconds.   No lesions on scalp, a cystic, squishy lesion over left middle finger nail matrix and a distorted nail proximal to that lesion - non-tender   Psychiatric: He has a normal mood and affect.   Nursing note and vitals reviewed.      Assessment:       1. Hospital discharge follow-up    2. History of cardioembolic cerebrovascular accident (CVA)    3. Skin lesion of hand    4. History of shingles        Plan:       PROBLEM LIST     Jamal was seen today for follow-up and hospital follow up.    Diagnoses and all orders for this visit:    Hospital discharge follow-up    History of cardioembolic cerebrovascular accident (CVA)  Comments:  right sided weakness    Skin lesion of hand  Comments:  left middle finger over nail matrix, distorting nail distally    History of shingles  Comments:  scalp, sequelae    we will find out whether Fiona can remove the skin lesion over middle finger nail matrix  The shingles-pain is a sequelae which we are expecting to subside

## 2022-11-17 ENCOUNTER — TRANSCRIBE ORDER (OUTPATIENT)
Dept: REGISTRATION | Age: 67
End: 2022-11-17

## 2022-11-17 ENCOUNTER — HOSPITAL ENCOUNTER (OUTPATIENT)
Dept: GENERAL RADIOLOGY | Age: 67
Discharge: HOME | End: 2022-11-17
Payer: MEDICARE

## 2022-11-17 DIAGNOSIS — M25.562 LEFT KNEE PAIN: Primary | ICD-10-CM

## 2022-11-17 DIAGNOSIS — M25.562 LEFT KNEE PAIN: ICD-10-CM

## 2022-11-17 PROCEDURE — 73562 X-RAY EXAM OF KNEE 3: CPT

## 2022-11-18 ENCOUNTER — TRANSCRIBE ORDER (OUTPATIENT)
Dept: SCHEDULING | Age: 67
End: 2022-11-18

## 2022-11-18 DIAGNOSIS — R25.1 TREMOR, UNSPECIFIED: Primary | ICD-10-CM

## 2022-12-20 ENCOUNTER — HOSPITAL ENCOUNTER (OUTPATIENT)
Dept: CT IMAGING | Age: 67
Discharge: HOME | End: 2022-12-20
Attending: NURSE PRACTITIONER
Payer: MEDICARE

## 2022-12-20 DIAGNOSIS — R25.1 TREMOR, UNSPECIFIED: ICD-10-CM

## 2022-12-20 PROCEDURE — 70450 CT HEAD/BRAIN W/O DYE: CPT

## 2023-02-17 ENCOUNTER — HOSPITAL ENCOUNTER (EMERGENCY)
Age: 68
Discharge: HOME | End: 2023-02-17
Attending: EMERGENCY MEDICINE
Payer: MEDICARE

## 2023-02-17 VITALS
TEMPERATURE: 98.6 F | DIASTOLIC BLOOD PRESSURE: 70 MMHG | SYSTOLIC BLOOD PRESSURE: 154 MMHG | OXYGEN SATURATION: 98 % | HEART RATE: 60 BPM | RESPIRATION RATE: 18 BRPM

## 2023-02-17 DIAGNOSIS — R33.9 URINARY RETENTION: Primary | ICD-10-CM

## 2023-02-17 LAB
APPEARANCE UR: ABNORMAL
BACTERIA URNS QL MICRO: NEGATIVE /HPF
BILIRUB UR QL: NEGATIVE
COLOR UR: ABNORMAL
EPITH CASTS URNS QL MICRO: ABNORMAL /LPF
GLUCOSE UR STRIP.AUTO-MCNC: NEGATIVE MG/DL
HGB UR QL STRIP: ABNORMAL
KETONES UR QL STRIP.AUTO: NEGATIVE MG/DL
LEUKOCYTE ESTERASE UR QL STRIP.AUTO: NEGATIVE
NITRITE UR QL STRIP.AUTO: NEGATIVE
PH UR STRIP: 8 (ref 5–8)
PROT UR STRIP-MCNC: ABNORMAL MG/DL
RBC #/AREA URNS HPF: >100 /HPF (ref 0–5)
SP GR UR REFRACTOMETRY: 1.01 (ref 1–1.03)
UA: UC IF INDICATED,UAUC: ABNORMAL
UROBILINOGEN UR QL STRIP.AUTO: 0.2 EU/DL (ref 0.2–1)
WBC URNS QL MICRO: ABNORMAL /HPF (ref 0–4)

## 2023-02-17 PROCEDURE — 81001 URINALYSIS AUTO W/SCOPE: CPT

## 2023-02-17 PROCEDURE — 51702 INSERT TEMP BLADDER CATH: CPT

## 2023-02-17 PROCEDURE — 74011000250 HC RX REV CODE- 250: Performed by: EMERGENCY MEDICINE

## 2023-02-17 PROCEDURE — 99283 EMERGENCY DEPT VISIT LOW MDM: CPT

## 2023-02-17 RX ORDER — LIDOCAINE HYDROCHLORIDE 20 MG/ML
JELLY TOPICAL
Status: COMPLETED | OUTPATIENT
Start: 2023-02-17 | End: 2023-02-17

## 2023-02-17 RX ADMIN — LIDOCAINE HYDROCHLORIDE: 20 JELLY TOPICAL at 10:31

## 2023-02-17 NOTE — ED PROVIDER NOTES
[facility] EMERGENCY DEP  EMERGENCY DEPARTMENT ENCOUNTER       Pt Name: Irais Buchanan  MRN: [id number]  Аннаgfromain 1955  Date of evaluation: 2/17/2023  Provider: Luli Evans MD   PCP: Corinne Cake, NP  Note Started: 10:36 AM 2/17/23     CHIEF COMPLAINT       Chief Complaint   Patient presents with    Urinary Retention        HISTORY OF PRESENT ILLNESS: 1 or more elements      History From: Patient  HPI Limitations : None     Irais Buchanan is a 77 y.o. male who presents ambulatory to the ER for difficulty urinating. Patient had a UroLift procedure on Tuesday. His wife took out the Thrasher yesterday morning and he has been having difficulty urinating since 3:00 yesterday afternoon. Since that time he is only been bowl to get out a teaspoon or maybe a half a cup of urine and notes it has been pink. He denies any fevers, chills, nausea or vomiting but does have lower abdominal pain. Nursing Notes were all reviewed and agreed with or any disagreements were addressed in the HPI. REVIEW OF SYSTEMS      Review of Systems   Constitutional:  Negative for activity change, appetite change, chills, fever and unexpected weight change. HENT:  Negative for congestion. Eyes:  Negative for pain and visual disturbance. Respiratory:  Negative for cough and shortness of breath. Cardiovascular:  Negative for chest pain. Gastrointestinal:  Positive for abdominal pain. Negative for diarrhea, nausea and vomiting. Genitourinary:  Positive for difficulty urinating and hematuria. Negative for dysuria. Musculoskeletal:  Negative for back pain. Skin:  Negative for rash. Neurological:  Negative for headaches. Positives and Pertinent negatives as per HPI. PAST HISTORY     Past Medical History:  Past Medical History:   Diagnosis Date    Hyperlipidemia        Past Surgical History:  No past surgical history on file. Family History:  History reviewed. No pertinent family history.     Social History:  Social History     Tobacco Use    Smoking status: Never    Smokeless tobacco: Never   Substance Use Topics    Alcohol use: Yes       Allergies:  No Known Allergies    CURRENT MEDICATIONS      Previous Medications    ALENDRONATE (FOSAMAX) 35 MG TABLET    Take  by mouth every seven (7) days. ATORVASTATIN (LIPITOR) 40 MG TABLET    Take  by mouth daily. BIOTIN 2,500 MCG CAP    Take  by mouth. BUTALBITAL-ACETAMINOPHEN-CAFF (FIORICET) -40 MG PER CAPSULE    Take 2 Caps by mouth every eight (8) hours as needed for Pain. CALCIUM-CHOLECALCIFEROL, D3, (CALCIUM 600 + D) 600-125 MG-UNIT TAB    Take  by mouth. CARBIDOPA-LEVODOPA (SINEMET)  MG PER TABLET    Take 1 Tablet by mouth three (3) times daily. EZETIMIBE (ZETIA) 10 MG TABLET    Take  by mouth. FLUTICASONE PROPIONATE (FLOVENT DISKUS) 50 MCG/ACTUATION INHALER    Take  by inhalation. GABAPENTIN (NEURONTIN) 600 MG TABLET    Take  by mouth three (3) times daily. MUPIROCIN CALCIUM (BACTROBAN) 2 % TOPICAL CREAM    Apply  to affected area two (2) times a day. OMEGA 3-DHA-EPA-FISH OIL (FISH OIL) 100-160-1,000 MG CAP    Take  by mouth. PROGESTERONE (PROMETRIUM) 100 MG CAPSULE    Take 100 mg by mouth daily. SIMVASTATIN (ZOCOR) 10 MG TABLET    Take  by mouth nightly. TAMSULOSIN (FLOMAX) 0.4 MG CAPSULE    Take 0.4 mg by mouth daily. TRAZODONE (DESYREL) 50 MG TABLET    Take  by mouth nightly. PHYSICAL EXAM      ED Triage Vitals   ED Encounter Vitals Group      BP 02/17/23 0930 (!) 154/70      Pulse (Heart Rate) 02/17/23 0930 67      Resp Rate 02/17/23 0930 20      Temp 02/17/23 0930 98.6 °F (37 °C)      Temp src --       O2 Sat (%) 02/17/23 0931 98 %      Weight --       Height --         Physical Exam  Vitals and nursing note reviewed. Constitutional:       Appearance: He is well-developed. He is not diaphoretic.       Comments: Elderly male with a flattened affect appearing uncomfortable with elevated blood pressure and mild to moderate distress due to pain   HENT:      Head: Normocephalic and atraumatic. Eyes:      General:         Right eye: No discharge. Left eye: No discharge. Conjunctiva/sclera: Conjunctivae normal.      Pupils: Pupils are equal, round, and reactive to light. Cardiovascular:      Rate and Rhythm: Normal rate and regular rhythm. Heart sounds: Normal heart sounds. No murmur heard. Pulmonary:      Effort: Pulmonary effort is normal. No respiratory distress. Breath sounds: Normal breath sounds. No wheezing or rales. Musculoskeletal:         General: Normal range of motion. Cervical back: Normal range of motion and neck supple. Skin:     General: Skin is warm and dry. Findings: No rash. Neurological:      Mental Status: He is alert and oriented to person, place, and time. Cranial Nerves: No cranial nerve deficit. Motor: No abnormal muscle tone.         DIAGNOSTIC RESULTS   LABS:     Recent Results (from the past 12 hour(s))   URINALYSIS W/ REFLEX CULTURE    Collection Time: 02/17/23 10:07 AM    Specimen: Urine   Result Value Ref Range    Color YELLOW/STRAW      Appearance HAZY (A) CLEAR      Specific gravity 1.010 1.003 - 1.030      pH (UA) 8.0 5.0 - 8.0      Protein TRACE (A) NEG mg/dL    Glucose Negative NEG mg/dL    Ketone Negative NEG mg/dL    Bilirubin Negative NEG      Blood LARGE (A) NEG      Urobilinogen 0.2 0.2 - 1.0 EU/dL    Nitrites Negative NEG      Leukocyte Esterase Negative NEG      WBC 0-4 0 - 4 /hpf    RBC >100 (H) 0 - 5 /hpf    Epithelial cells NONE SEEN (A) FEW /lpf    Bacteria Negative NEG /hpf    UA:UC IF INDICATED CULTURE NOT INDICATED BY UA RESULT CNI           PROCEDURES   Unless otherwise noted below, none  Procedures     CRITICAL CARE TIME   0    EMERGENCY DEPARTMENT COURSE and DIFFERENTIAL DIAGNOSIS/MDM   Vitals:    Vitals:    02/17/23 0930 02/17/23 0931   BP: (!) 154/70 (!) 154/70   Pulse: 67 60   Resp: 20 18   Temp: 98.6 °F (37 °C) 98.6 °F (37 °C)   SpO2:  98%        Patient was given the following medications:  Medications   lidocaine (URO-JET/ GLYDO) 2 % jelly ( Urethral Given 2/17/23 1031)       CONSULTS: (Who and What was discussed)  None    Chronic Conditions: Cholesterol, hypertension, BPH    Social Determinants affecting Dx or Tx: None    Records Reviewed (source and summary of external notes): Prior medical records, Previous Radiology studies, Previous Laboratory studies, and Nursing notes    CC/HPI Summary, DDx, ED Course, and Reassessment: Elderly male with urinary retention and hematuria presenting after urologic procedure on Tuesday. Thrasher was removed yesterday morning at 8 AM.  Of note, patient has been on antibiotics since Wednesday. He is afebrile with normal vital signs except for elevated blood pressure likely secondary to his pain. Request postvoid bladder scanning and will evaluate patient for need of Thrasher catheter. 10:39 AM   Patient states from an abdominal pressure perspective he is feeling better but he still does not \"feel right\". His heart rate is normal, his abdomen is no longer distended and his urine drainage into the Thrasher catheter is clear. I do not see any evidence of sepsis, acute peritonitis or other intra-abdominal pathology secondary to urologic procedure. Disposition Considerations (Tests not done, Shared Decision Making, Pt Expectation of Test or Tx.): Elderly male with BPH status post UroLift procedure on Tuesday presented with urinary retention. Has had 700 mils of urine out since Thrasher placement but it is clear and does not appear clotted patient does not appear to need irrigation at this time. Urinalysis without evidence of acute infection. Continue his prophylactic antibiotics and will follow up with urology by phone today for further recommendations and appointments. FINAL IMPRESSION     1.  Urinary retention          DISPOSITION/PLAN   Discharged    Discharge Note: The patient is stable for discharge home. The signs, symptoms, diagnosis, and discharge instructions have been discussed, understanding conveyed, and agreed upon. The patient is to follow up as recommended or return to ER should their symptoms worsen. PATIENT REFERRED TO:  Follow-up Information       Follow up With Specialties Details Why Contact Info    [address] [place] [address] Emergency Medicine  If symptoms worsen Paul Temple  [phone]              DISCHARGE MEDICATIONS:  Current Discharge Medication List            DISCONTINUED MEDICATIONS:  Current Discharge Medication List          I am the Primary Clinician of Record. Mary Huerta. MD Cristina (electronically signed)    (Please note that parts of this dictation were completed with voice recognition software. Quite often unanticipated grammatical, syntax, homophones, and other interpretive errors are inadvertently transcribed by the computer software. Please disregards these errors.  Please excuse any errors that have escaped final proofreading.)

## 2023-02-17 NOTE — ED NOTES
Leg bag placed in anticipation for discharge. Thrasher care/ emptying education provided to patient and wife.

## 2023-02-17 NOTE — ED TRIAGE NOTES
Presents with c/o unable to void since 1500 on 2/19. Patient is post urolift procedure done in [address] [place] [address] on 2/14. Catheter was removed by patient on 2/16 per instructions. Bladder scan showed 783ml. MD notified.

## 2023-02-17 NOTE — DISCHARGE INSTRUCTIONS
You were seen in the emergency room with difficulty urinating after urologic procedure. I would like you to call your urologist today so they are aware you were in the emergency room, had a Thrasher placed and they can schedule you an appointment for next week as a recheck. If you develop any problems with the Thrasher, please return to the emergency room for further treatment.

## 2023-02-28 ENCOUNTER — HOSPITAL ENCOUNTER (EMERGENCY)
Age: 68
Discharge: HOME | End: 2023-02-28
Attending: EMERGENCY MEDICINE
Payer: MEDICARE

## 2023-02-28 VITALS
TEMPERATURE: 98.3 F | OXYGEN SATURATION: 96 % | WEIGHT: 240 LBS | HEIGHT: 71 IN | HEART RATE: 81 BPM | DIASTOLIC BLOOD PRESSURE: 70 MMHG | RESPIRATION RATE: 19 BRPM | BODY MASS INDEX: 33.6 KG/M2 | SYSTOLIC BLOOD PRESSURE: 133 MMHG

## 2023-02-28 DIAGNOSIS — R31.9 HEMATURIA, UNSPECIFIED TYPE: ICD-10-CM

## 2023-02-28 DIAGNOSIS — R33.8 ACUTE URINARY RETENTION: Primary | ICD-10-CM

## 2023-02-28 LAB
APPEARANCE UR: CLEAR
BACTERIA URNS QL MICRO: NEGATIVE /HPF
BILIRUB UR QL: NEGATIVE
COLOR UR: ABNORMAL
EPITH CASTS URNS QL MICRO: ABNORMAL /LPF
GLUCOSE UR STRIP.AUTO-MCNC: NEGATIVE MG/DL
HGB UR QL STRIP: ABNORMAL
KETONES UR QL STRIP.AUTO: NEGATIVE MG/DL
LEUKOCYTE ESTERASE UR QL STRIP.AUTO: NEGATIVE
NITRITE UR QL STRIP.AUTO: NEGATIVE
PH UR STRIP: 7 (ref 5–8)
PROT UR STRIP-MCNC: 100 MG/DL
RBC #/AREA URNS HPF: >100 /HPF (ref 0–5)
SP GR UR REFRACTOMETRY: 1.01 (ref 1–1.03)
UROBILINOGEN UR QL STRIP.AUTO: 0.2 EU/DL (ref 0.2–1)
WBC URNS QL MICRO: ABNORMAL /HPF (ref 0–4)

## 2023-02-28 PROCEDURE — 77030034849

## 2023-02-28 PROCEDURE — 81001 URINALYSIS AUTO W/SCOPE: CPT

## 2023-02-28 PROCEDURE — 51702 INSERT TEMP BLADDER CATH: CPT

## 2023-02-28 PROCEDURE — 51798 US URINE CAPACITY MEASURE: CPT

## 2023-02-28 PROCEDURE — 77030012862 HC BG URIN LEG BARD -A

## 2023-02-28 PROCEDURE — 99283 EMERGENCY DEPT VISIT LOW MDM: CPT

## 2023-02-28 NOTE — ED PROVIDER NOTES
[facility] EMERGENCY DEP  EMERGENCY DEPARTMENT ENCOUNTER       Pt Name: Tray Luevano  MRN: [id number]  [place] 1955  Date of evaluation: 2/28/2023  Provider: Juarez Haas MD   PCP: Hima Roth NP  Note Started: 1:43 AM 2/28/23     CHIEF COMPLAINT       Chief Complaint   Patient presents with    Urinary Retention        HISTORY OF PRESENT ILLNESS: 1 or more elements      History From: Patient  HPI Limitations : None     Tray Luevano is a 77 y.o. male with a h/o BPH, HLD who presents to the ED c/o difficulty urinating. Pt had a urolift done on 2/15/23. Pt's wfe removed his catheter the following day and pt was voiding without issue. The next day, pt was unable to void. He was seen at [facility] ER and a harden catheter was placed for acute urinary retention. Pt saw urology yesterday and the catheter was removed. He states that he had been urinating all day without issue until tonight. Pt reports passing large clots in his urine. When he woke up out of his sleep, \"it felt like my bladder was going to explode\". Pt was on plavix but stopped taking it 3 days ago. Denies fever, chills, N/V, flank pain, abdominal pain, dysuria. Nursing Notes were all reviewed and agreed with or any disagreements were addressed in the HPI. REVIEW OF SYSTEMS      Review of Systems   Genitourinary:  Positive for decreased urine volume. All other systems reviewed and are negative. Positives and Pertinent negatives as per HPI. PAST HISTORY     Past Medical History:  Past Medical History:   Diagnosis Date    Hyperlipidemia        Past Surgical History:  No past surgical history on file. Family History:  History reviewed. No pertinent family history. Social History:  Social History     Tobacco Use    Smoking status: Never    Smokeless tobacco: Never   Substance Use Topics    Alcohol use:  Yes       Allergies:  No Known Allergies    CURRENT MEDICATIONS      Previous Medications    ALENDRONATE (FOSAMAX) 35 MG TABLET Take  by mouth every seven (7) days. ATORVASTATIN (LIPITOR) 40 MG TABLET    Take  by mouth daily. BIOTIN 2,500 MCG CAP    Take  by mouth. BUTALBITAL-ACETAMINOPHEN-CAFF (FIORICET) -40 MG PER CAPSULE    Take 2 Caps by mouth every eight (8) hours as needed for Pain. CALCIUM-CHOLECALCIFEROL, D3, (CALCIUM 600 + D) 600-125 MG-UNIT TAB    Take  by mouth. CARBIDOPA-LEVODOPA (SINEMET)  MG PER TABLET    Take 1 Tablet by mouth three (3) times daily. EZETIMIBE (ZETIA) 10 MG TABLET    Take  by mouth. FLUTICASONE PROPIONATE (FLOVENT DISKUS) 50 MCG/ACTUATION INHALER    Take  by inhalation. GABAPENTIN (NEURONTIN) 600 MG TABLET    Take  by mouth three (3) times daily. MUPIROCIN CALCIUM (BACTROBAN) 2 % TOPICAL CREAM    Apply  to affected area two (2) times a day. OMEGA 3-DHA-EPA-FISH OIL (FISH OIL) 100-160-1,000 MG CAP    Take  by mouth. PROGESTERONE (PROMETRIUM) 100 MG CAPSULE    Take 100 mg by mouth daily. SIMVASTATIN (ZOCOR) 10 MG TABLET    Take  by mouth nightly. TAMSULOSIN (FLOMAX) 0.4 MG CAPSULE    Take 0.4 mg by mouth daily. TRAZODONE (DESYREL) 50 MG TABLET    Take  by mouth nightly. PHYSICAL EXAM      ED Triage Vitals   ED Encounter Vitals Group      BP       Pulse       Resp       Temp       Temp src       SpO2       Weight       Height         Physical Exam  Vitals and nursing note reviewed. Constitutional:       General: He is not in acute distress. Appearance: Normal appearance. HENT:      Head: Normocephalic and atraumatic. Mouth/Throat:      Mouth: Mucous membranes are moist.   Eyes:      Extraocular Movements: Extraocular movements intact. Conjunctiva/sclera: Conjunctivae normal.   Cardiovascular:      Rate and Rhythm: Normal rate and regular rhythm. Pulses: Normal pulses. Heart sounds: Normal heart sounds. Pulmonary:      Effort: Pulmonary effort is normal. No respiratory distress. Breath sounds: Normal breath sounds.  No wheezing or rales. Abdominal:      General: Abdomen is flat. Bowel sounds are normal. There is no distension. Palpations: Abdomen is soft. Tenderness: There is no abdominal tenderness (discomfort in suprapubic region). There is no guarding. Musculoskeletal:         General: No swelling. Normal range of motion. Cervical back: Normal range of motion and neck supple. Skin:     General: Skin is warm and dry. Capillary Refill: Capillary refill takes less than 2 seconds. Neurological:      General: No focal deficit present. Mental Status: He is alert and oriented to person, place, and time. Psychiatric:         Mood and Affect: Mood normal.         Behavior: Behavior normal.        DIAGNOSTIC RESULTS   LABS:     Recent Results (from the past 12 hour(s))   URINALYSIS W/ RFLX MICROSCOPIC    Collection Time: 02/28/23  1:55 AM   Result Value Ref Range    Color RED      Appearance CLEAR CLEAR      Specific gravity 1.010 1.003 - 1.030      pH (UA) 7.0 5.0 - 8.0      Protein 100 (A) NEG mg/dL    Glucose Negative NEG mg/dL    Ketone Negative NEG mg/dL    Bilirubin Negative NEG      Blood LARGE (A) NEG      Urobilinogen 0.2 0.2 - 1.0 EU/dL    Nitrites Negative NEG      Leukocyte Esterase Negative NEG     URINE MICROSCOPIC ONLY    Collection Time: 02/28/23  1:55 AM   Result Value Ref Range    WBC 0-4 0 - 4 /hpf    RBC >100 (H) 0 - 5 /hpf    Epithelial cells FEW FEW /lpf    Bacteria Negative NEG /hpf        EKG: None     RADIOLOGY:  Non-plain film images such as CT, Ultrasound and MRI are read by the radiologist. Plain radiographic images are visualized and preliminarily interpreted by the ED Provider with the below findings:     None     Interpretation per the Radiologist below, if available at the time of this note:     No results found.       PROCEDURES   Unless otherwise noted below, none  Procedures     CRITICAL CARE TIME   None    EMERGENCY DEPARTMENT COURSE and DIFFERENTIAL DIAGNOSIS/MDM Vitals:    Vitals:    02/28/23 0143   BP: (!) 160/87   Pulse: 81   Resp: 19   Temp: 98.3 °F (36.8 °C)   SpO2: 98%   Weight: 108.9 kg (240 lb)   Height: 5' 11\" (1.803 m)        Patient was given the following medications:  Medications - No data to display    CONSULTS: (Who and What was discussed)  None    Chronic Conditions:     Social Determinants affecting Dx or Tx: None    Records Reviewed (source and summary of external notes): Prior medical records and Nursing notes    CC/HPI Summary, DDx, ED Course, and Reassessment:     ED Course as of 02/28/23 0216 Tue Feb 28, 2023 0147 Pt with difficulty urinating tonight. Bladder scan shows 744 cc in bladder. Thrasher catheter placed with 1100cc alessia aid colored urine out. Pt feels significantly better. [SJ]   0214 UA is negative for infection. Pt instructed to call his urologist in the morning. [SJ]      ED Course User Index  [SJ] Akanksha Jones MD         FINAL IMPRESSION     1. Acute urinary retention    2. Hematuria, unspecified type          DISPOSITION/PLAN   Discharged    Discharge Note: The patient is stable for discharge home. The signs, symptoms, diagnosis, and discharge instructions have been discussed, understanding conveyed, and agreed upon. The patient is to follow up as recommended or return to ER should their symptoms worsen. PATIENT REFERRED TO:  Follow-up Information       Follow up With Specialties Details Why Contact Info    Елена Davies MD Urology Call today  [address] [facility] [address]  [address] [address] [facility]      [address] [facility] Emergency Medicine  As needed, If symptoms worsen [address] [phone]              DISCHARGE MEDICATIONS:  Current Discharge Medication List            DISCONTINUED MEDICATIONS:  Current Discharge Medication List          I am the Primary Clinician of Record.    David Alvarez MD (electronically signed)

## 2023-02-28 NOTE — DISCHARGE INSTRUCTIONS
Thank you for allowing us to provide you with medical care today. We realize that you have many choices for your emergency care needs. We thank you for choosing [facility]. Please choose us in the future for any continued health care needs. The exam and treatment you received in the Emergency Department were for an emergent problem and are not intended as complete care. It is important that you follow up with a doctor, nurse practitioner, or physician's assistant for ongoing care. If your symptoms worsen or you do not improve as expected and you are unable to reach your usual health care provider, you should return to the Emergency Department. We are available 24 hours a day. Please make an appointment with your health care provider(s) for follow up of your Emergency Department visit. Take this sheet with you when you go to your follow-up visit.

## 2023-02-28 NOTE — ED TRIAGE NOTES
Pt arrived POV with c/o urinary retention and discomfort after not being able to void for the past couple hours when going to sleep. Pt reports having harden cath removed this morning and had Urolift surgery done last week. Pt states that the MD said he may pass blood clots due to the surgery and Pt reports passing several blood clots while voiding throughout the day. Pt also states he stopped his blood thinner, Plavix, 3 days ago. Pt alert and oriented x4. And is able to answer triage questions. Bladder scan completed during triage showing 744 mls. MD aware and orders placed.

## 2023-03-11 ENCOUNTER — HOSPITAL ENCOUNTER (EMERGENCY)
Age: 68
Discharge: HOME | End: 2023-03-11
Attending: EMERGENCY MEDICINE
Payer: MEDICARE

## 2023-03-11 VITALS
DIASTOLIC BLOOD PRESSURE: 64 MMHG | RESPIRATION RATE: 24 BRPM | WEIGHT: 240 LBS | SYSTOLIC BLOOD PRESSURE: 140 MMHG | OXYGEN SATURATION: 96 % | BODY MASS INDEX: 33.47 KG/M2 | TEMPERATURE: 98.3 F | HEART RATE: 84 BPM

## 2023-03-11 DIAGNOSIS — R31.9 HEMATURIA, UNSPECIFIED TYPE: ICD-10-CM

## 2023-03-11 DIAGNOSIS — R33.9 URINARY RETENTION: Primary | ICD-10-CM

## 2023-03-11 LAB
APPEARANCE UR: ABNORMAL
BACTERIA URNS QL MICRO: NEGATIVE /HPF
BILIRUB UR QL: NEGATIVE
COLOR UR: ABNORMAL
EPITH CASTS URNS QL MICRO: ABNORMAL /LPF
GLUCOSE UR STRIP.AUTO-MCNC: NEGATIVE MG/DL
HGB UR QL STRIP: ABNORMAL
KETONES UR QL STRIP.AUTO: NEGATIVE MG/DL
LEUKOCYTE ESTERASE UR QL STRIP.AUTO: NEGATIVE
MUCOUS THREADS URNS QL MICRO: ABNORMAL /LPF
NITRITE UR QL STRIP.AUTO: NEGATIVE
PH UR STRIP: 6.5 (ref 5–8)
PROT UR STRIP-MCNC: 30 MG/DL
RBC #/AREA URNS HPF: >100 /HPF (ref 0–5)
SP GR UR REFRACTOMETRY: 1.01 (ref 1–1.03)
UROBILINOGEN UR QL STRIP.AUTO: 0.2 EU/DL (ref 0.2–1)
WBC URNS QL MICRO: ABNORMAL /HPF (ref 0–4)

## 2023-03-11 PROCEDURE — 99283 EMERGENCY DEPT VISIT LOW MDM: CPT

## 2023-03-11 PROCEDURE — 51702 INSERT TEMP BLADDER CATH: CPT

## 2023-03-11 PROCEDURE — 77030005529 HC CATH URETH FOL40 BARD -A

## 2023-03-11 PROCEDURE — 77030034849

## 2023-03-11 PROCEDURE — 81001 URINALYSIS AUTO W/SCOPE: CPT

## 2023-03-12 ENCOUNTER — APPOINTMENT (OUTPATIENT)
Facility: HOSPITAL | Age: 68
DRG: 920 | End: 2023-03-12
Payer: MEDICARE

## 2023-03-12 ENCOUNTER — TELEPHONE (OUTPATIENT)
Facility: HOSPITAL | Age: 68
End: 2023-03-12

## 2023-03-12 ENCOUNTER — HOSPITAL ENCOUNTER (INPATIENT)
Facility: HOSPITAL | Age: 68
LOS: 2 days | Discharge: HOME | DRG: 920 | End: 2023-03-14
Attending: EMERGENCY MEDICINE | Admitting: INTERNAL MEDICINE
Payer: MEDICARE

## 2023-03-12 ENCOUNTER — APPOINTMENT (OUTPATIENT)
Dept: CT IMAGING | Age: 68
End: 2023-03-12
Attending: EMERGENCY MEDICINE
Payer: MEDICARE

## 2023-03-12 ENCOUNTER — HOSPITAL ENCOUNTER (EMERGENCY)
Age: 68
Discharge: ANOTHER HOSPITAL | End: 2023-03-12
Attending: EMERGENCY MEDICINE
Payer: MEDICARE

## 2023-03-12 VITALS
WEIGHT: 240 LBS | RESPIRATION RATE: 18 BRPM | SYSTOLIC BLOOD PRESSURE: 137 MMHG | BODY MASS INDEX: 33.47 KG/M2 | TEMPERATURE: 98.8 F | OXYGEN SATURATION: 95 % | DIASTOLIC BLOOD PRESSURE: 61 MMHG | HEART RATE: 78 BPM

## 2023-03-12 DIAGNOSIS — R31.0 GROSS HEMATURIA: Primary | ICD-10-CM

## 2023-03-12 DIAGNOSIS — R33.9 URINARY RETENTION: Primary | ICD-10-CM

## 2023-03-12 DIAGNOSIS — R31.0 GROSS HEMATURIA: ICD-10-CM

## 2023-03-12 PROBLEM — Z87.09 HISTORY OF COPD: Status: ACTIVE | Noted: 2023-03-12

## 2023-03-12 PROBLEM — Z86.73 HISTORY OF CVA (CEREBROVASCULAR ACCIDENT) WITHOUT RESIDUAL DEFICITS: Status: ACTIVE | Noted: 2023-03-12

## 2023-03-12 LAB
ALBUMIN SERPL-MCNC: 3 G/DL (ref 3.4–5)
ALBUMIN SERPL-MCNC: 3.5 G/DL (ref 3.5–5)
ALBUMIN/GLOB SERPL: 0.8 (ref 0.8–1.7)
ALBUMIN/GLOB SERPL: 0.9 (ref 1.1–2.2)
ALP SERPL-CCNC: 100 U/L (ref 45–117)
ALP SERPL-CCNC: 82 U/L (ref 45–117)
ALT SERPL-CCNC: 26 U/L (ref 16–61)
ALT SERPL-CCNC: 30 U/L (ref 12–78)
ANION GAP SERPL CALC-SCNC: 2 MMOL/L (ref 3–18)
ANION GAP SERPL CALC-SCNC: 9 MMOL/L (ref 5–15)
APPEARANCE UR: ABNORMAL
AST SERPL-CCNC: 17 U/L (ref 10–38)
AST SERPL-CCNC: 19 U/L (ref 15–37)
B PERT DNA SPEC QL NAA+PROBE: NOT DETECTED
BACTERIA URNS QL MICRO: NEGATIVE /HPF
BASOPHILS # BLD: 0.1 K/UL (ref 0–0.1)
BASOPHILS # BLD: 0.1 K/UL (ref 0–0.1)
BASOPHILS NFR BLD: 1 % (ref 0–1)
BASOPHILS NFR BLD: 1 % (ref 0–2)
BILIRUB SERPL-MCNC: 0.3 MG/DL (ref 0.2–1)
BILIRUB SERPL-MCNC: 0.4 MG/DL (ref 0.2–1)
BILIRUB UR QL: NEGATIVE
BORDETELLA PARAPERTUSSIS BY PCR: NOT DETECTED
BUN SERPL-MCNC: 14 MG/DL (ref 7–18)
BUN SERPL-MCNC: 22 MG/DL (ref 6–20)
BUN/CREAT SERPL: 15 (ref 12–20)
BUN/CREAT SERPL: 19 (ref 12–20)
C PNEUM DNA SPEC QL NAA+PROBE: NOT DETECTED
CALCIUM SERPL-MCNC: 8.5 MG/DL (ref 8.5–10.1)
CALCIUM SERPL-MCNC: 9.2 MG/DL (ref 8.5–10.1)
CHLORIDE SERPL-SCNC: 100 MMOL/L (ref 97–108)
CHLORIDE SERPL-SCNC: 109 MMOL/L (ref 100–111)
CO2 SERPL-SCNC: 27 MMOL/L (ref 21–32)
CO2 SERPL-SCNC: 28 MMOL/L (ref 21–32)
COLOR UR: ABNORMAL
CREAT SERPL-MCNC: 0.91 MG/DL (ref 0.6–1.3)
CREAT SERPL-MCNC: 1.18 MG/DL (ref 0.7–1.3)
DIFFERENTIAL METHOD BLD: ABNORMAL
DIFFERENTIAL METHOD BLD: ABNORMAL
EOSINOPHIL # BLD: 0.2 K/UL (ref 0–0.4)
EOSINOPHIL # BLD: 0.2 K/UL (ref 0–0.4)
EOSINOPHIL NFR BLD: 2 % (ref 0–5)
EOSINOPHIL NFR BLD: 2 % (ref 0–7)
EPITH CASTS URNS QL MICRO: NORMAL /LPF (ref 0–5)
ERYTHROCYTE [DISTWIDTH] IN BLOOD BY AUTOMATED COUNT: 14.1 % (ref 11.5–14.5)
ERYTHROCYTE [DISTWIDTH] IN BLOOD BY AUTOMATED COUNT: 14.4 % (ref 11.6–14.5)
FLUAV SUBTYP SPEC NAA+PROBE: NOT DETECTED
FLUBV RNA SPEC QL NAA+PROBE: NOT DETECTED
GLOBULIN SER CALC-MCNC: 3.8 G/DL (ref 2–4)
GLOBULIN SER CALC-MCNC: 4 G/DL (ref 2–4)
GLUCOSE SERPL-MCNC: 100 MG/DL (ref 74–99)
GLUCOSE SERPL-MCNC: 122 MG/DL (ref 65–100)
GLUCOSE UR STRIP.AUTO-MCNC: NEGATIVE MG/DL
HADV DNA SPEC QL NAA+PROBE: NOT DETECTED
HCOV 229E RNA SPEC QL NAA+PROBE: NOT DETECTED
HCOV HKU1 RNA SPEC QL NAA+PROBE: NOT DETECTED
HCOV NL63 RNA SPEC QL NAA+PROBE: NOT DETECTED
HCOV OC43 RNA SPEC QL NAA+PROBE: NOT DETECTED
HCT VFR BLD AUTO: 36 % (ref 36–48)
HCT VFR BLD AUTO: 41.4 % (ref 36.6–50.3)
HGB BLD-MCNC: 12.1 G/DL (ref 13–16)
HGB BLD-MCNC: 14.2 G/DL (ref 12.1–17)
HGB UR QL STRIP: ABNORMAL
HMPV RNA SPEC QL NAA+PROBE: NOT DETECTED
HPIV1 RNA SPEC QL NAA+PROBE: NOT DETECTED
HPIV2 RNA SPEC QL NAA+PROBE: NOT DETECTED
HPIV3 RNA SPEC QL NAA+PROBE: NOT DETECTED
HPIV4 RNA SPEC QL NAA+PROBE: NOT DETECTED
IMM GRANULOCYTES # BLD AUTO: 0 K/UL (ref 0–0.04)
IMM GRANULOCYTES # BLD AUTO: 0 K/UL (ref 0–0.04)
IMM GRANULOCYTES NFR BLD AUTO: 0 % (ref 0–0.5)
IMM GRANULOCYTES NFR BLD AUTO: 0 % (ref 0–0.5)
INR PPP: 1 (ref 0.9–1.1)
KETONES UR QL STRIP.AUTO: NEGATIVE MG/DL
LEUKOCYTE ESTERASE UR QL STRIP.AUTO: ABNORMAL
LYMPHOCYTES # BLD: 0.8 K/UL (ref 0.9–3.6)
LYMPHOCYTES # BLD: 0.9 K/UL (ref 0.8–3.5)
LYMPHOCYTES NFR BLD: 11 % (ref 12–49)
LYMPHOCYTES NFR BLD: 12 % (ref 21–52)
M PNEUMO DNA SPEC QL NAA+PROBE: NOT DETECTED
MCH RBC QN AUTO: 31.3 PG (ref 24–34)
MCH RBC QN AUTO: 31.8 PG (ref 26–34)
MCHC RBC AUTO-ENTMCNC: 33.6 G/DL (ref 31–37)
MCHC RBC AUTO-ENTMCNC: 34.3 G/DL (ref 30–36.5)
MCV RBC AUTO: 92.8 FL (ref 80–99)
MCV RBC AUTO: 93.3 FL (ref 78–100)
MONOCYTES # BLD: 0.8 K/UL (ref 0.05–1.2)
MONOCYTES # BLD: 0.8 K/UL (ref 0–1)
MONOCYTES NFR BLD: 11 % (ref 3–10)
MONOCYTES NFR BLD: 9 % (ref 5–13)
NEUTS SEG # BLD: 4.9 K/UL (ref 1.8–8)
NEUTS SEG # BLD: 6.8 K/UL (ref 1.8–8)
NEUTS SEG NFR BLD: 73 % (ref 40–73)
NEUTS SEG NFR BLD: 77 % (ref 32–75)
NITRITE UR QL STRIP.AUTO: NEGATIVE
NRBC # BLD: 0 K/UL (ref 0–0.01)
NRBC # BLD: 0 K/UL (ref 0–0.01)
NRBC BLD-RTO: 0 PER 100 WBC
NRBC BLD-RTO: 0 PER 100 WBC
PH UR STRIP: 7 (ref 5–8)
PLATELET # BLD AUTO: 222 K/UL (ref 135–420)
PLATELET # BLD AUTO: 226 K/UL (ref 150–400)
PMV BLD AUTO: 11.4 FL (ref 9.2–11.8)
PMV BLD AUTO: 12.1 FL (ref 8.9–12.9)
POTASSIUM SERPL-SCNC: 3.8 MMOL/L (ref 3.5–5.5)
POTASSIUM SERPL-SCNC: 4.1 MMOL/L (ref 3.5–5.1)
PROT SERPL-MCNC: 6.8 G/DL (ref 6.4–8.2)
PROT SERPL-MCNC: 7.5 G/DL (ref 6.4–8.2)
PROT UR STRIP-MCNC: 100 MG/DL
PROTHROMBIN TIME: 10 SEC (ref 9–11.1)
RBC # BLD AUTO: 3.86 M/UL (ref 4.35–5.65)
RBC # BLD AUTO: 4.46 M/UL (ref 4.1–5.7)
RBC #/AREA URNS HPF: NORMAL /HPF (ref 0–5)
RSV RNA SPEC QL NAA+PROBE: NOT DETECTED
RV+EV RNA SPEC QL NAA+PROBE: NOT DETECTED
SARS-COV-2 RNA RESP QL NAA+PROBE: NOT DETECTED
SODIUM SERPL-SCNC: 137 MMOL/L (ref 136–145)
SODIUM SERPL-SCNC: 138 MMOL/L (ref 136–145)
SP GR UR REFRACTOMETRY: 1.01 (ref 1–1.03)
UROBILINOGEN UR QL STRIP.AUTO: 0.2 EU/DL (ref 0.2–1)
WBC # BLD AUTO: 6.7 K/UL (ref 4.6–13.2)
WBC # BLD AUTO: 8.9 K/UL (ref 4.1–11.1)
WBC URNS QL MICRO: NORMAL /HPF (ref 0–4)

## 2023-03-12 PROCEDURE — 6360000002 HC RX W HCPCS

## 2023-03-12 PROCEDURE — 94761 N-INVAS EAR/PLS OXIMETRY MLT: CPT

## 2023-03-12 PROCEDURE — 99285 EMERGENCY DEPT VISIT HI MDM: CPT

## 2023-03-12 PROCEDURE — 1100000000 HC RM PRIVATE

## 2023-03-12 PROCEDURE — 6370000000 HC RX 637 (ALT 250 FOR IP)

## 2023-03-12 PROCEDURE — 0202U NFCT DS 22 TRGT SARS-COV-2: CPT

## 2023-03-12 PROCEDURE — 80053 COMPREHEN METABOLIC PANEL: CPT

## 2023-03-12 PROCEDURE — 36415 COLL VENOUS BLD VENIPUNCTURE: CPT

## 2023-03-12 PROCEDURE — 2580000003 HC RX 258

## 2023-03-12 PROCEDURE — 71046 X-RAY EXAM CHEST 2 VIEWS: CPT

## 2023-03-12 PROCEDURE — 87086 URINE CULTURE/COLONY COUNT: CPT

## 2023-03-12 PROCEDURE — 77030005529 HC CATH URETH FOL40 BARD -A

## 2023-03-12 PROCEDURE — 77030019927 HC TBNG IRR CYSTO BAXT -A

## 2023-03-12 PROCEDURE — 51798 US URINE CAPACITY MEASURE: CPT

## 2023-03-12 PROCEDURE — 85025 COMPLETE CBC W/AUTO DIFF WBC: CPT

## 2023-03-12 PROCEDURE — 77030034849

## 2023-03-12 PROCEDURE — 99222 1ST HOSP IP/OBS MODERATE 55: CPT

## 2023-03-12 PROCEDURE — 51702 INSERT TEMP BLADDER CATH: CPT

## 2023-03-12 PROCEDURE — 81001 URINALYSIS AUTO W/SCOPE: CPT

## 2023-03-12 PROCEDURE — 74011250636 HC RX REV CODE- 250/636: Performed by: EMERGENCY MEDICINE

## 2023-03-12 PROCEDURE — 74176 CT ABD & PELVIS W/O CONTRAST: CPT

## 2023-03-12 PROCEDURE — 85610 PROTHROMBIN TIME: CPT

## 2023-03-12 RX ORDER — ARFORMOTEROL TARTRATE 15 UG/2ML
15 SOLUTION RESPIRATORY (INHALATION) 2 TIMES DAILY
Status: DISCONTINUED | OUTPATIENT
Start: 2023-03-12 | End: 2023-03-14 | Stop reason: HOSPADM

## 2023-03-12 RX ORDER — FAMOTIDINE 20 MG/1
20 TABLET, FILM COATED ORAL DAILY
Status: DISCONTINUED | OUTPATIENT
Start: 2023-03-12 | End: 2023-03-14 | Stop reason: HOSPADM

## 2023-03-12 RX ORDER — ONDANSETRON 4 MG/1
4 TABLET, ORALLY DISINTEGRATING ORAL EVERY 8 HOURS PRN
Status: DISCONTINUED | OUTPATIENT
Start: 2023-03-12 | End: 2023-03-14 | Stop reason: HOSPADM

## 2023-03-12 RX ORDER — SODIUM CHLORIDE 9 MG/ML
100 INJECTION, SOLUTION INTRAVENOUS ONCE
Status: COMPLETED | OUTPATIENT
Start: 2023-03-12 | End: 2023-03-12

## 2023-03-12 RX ORDER — SODIUM CHLORIDE 0.9 % (FLUSH) 0.9 %
5-40 SYRINGE (ML) INJECTION EVERY 12 HOURS SCHEDULED
Status: DISCONTINUED | OUTPATIENT
Start: 2023-03-12 | End: 2023-03-14 | Stop reason: HOSPADM

## 2023-03-12 RX ORDER — ONDANSETRON 2 MG/ML
4 INJECTION INTRAMUSCULAR; INTRAVENOUS EVERY 6 HOURS PRN
Status: DISCONTINUED | OUTPATIENT
Start: 2023-03-12 | End: 2023-03-14 | Stop reason: HOSPADM

## 2023-03-12 RX ORDER — IPRATROPIUM BROMIDE AND ALBUTEROL SULFATE 2.5; .5 MG/3ML; MG/3ML
1 SOLUTION RESPIRATORY (INHALATION) EVERY 4 HOURS PRN
Status: DISCONTINUED | OUTPATIENT
Start: 2023-03-12 | End: 2023-03-14 | Stop reason: HOSPADM

## 2023-03-12 RX ORDER — ATORVASTATIN CALCIUM 40 MG/1
80 TABLET, FILM COATED ORAL NIGHTLY
Status: DISCONTINUED | OUTPATIENT
Start: 2023-03-12 | End: 2023-03-14 | Stop reason: HOSPADM

## 2023-03-12 RX ORDER — SODIUM CHLORIDE 0.9 % (FLUSH) 0.9 %
5-10 SYRINGE (ML) INJECTION ONCE
Status: DISCONTINUED | OUTPATIENT
Start: 2023-03-12 | End: 2023-03-12 | Stop reason: HOSPADM

## 2023-03-12 RX ORDER — TAMSULOSIN HYDROCHLORIDE 0.4 MG/1
0.4 CAPSULE ORAL DAILY
Status: DISCONTINUED | OUTPATIENT
Start: 2023-03-12 | End: 2023-03-14 | Stop reason: HOSPADM

## 2023-03-12 RX ORDER — ACETAMINOPHEN 325 MG/1
650 TABLET ORAL EVERY 6 HOURS PRN
Status: DISCONTINUED | OUTPATIENT
Start: 2023-03-12 | End: 2023-03-14 | Stop reason: HOSPADM

## 2023-03-12 RX ORDER — ACETAMINOPHEN 650 MG/1
650 SUPPOSITORY RECTAL EVERY 6 HOURS PRN
Status: DISCONTINUED | OUTPATIENT
Start: 2023-03-12 | End: 2023-03-14 | Stop reason: HOSPADM

## 2023-03-12 RX ORDER — SODIUM CHLORIDE 0.9 % (FLUSH) 0.9 %
5-40 SYRINGE (ML) INJECTION PRN
Status: DISCONTINUED | OUTPATIENT
Start: 2023-03-12 | End: 2023-03-14 | Stop reason: HOSPADM

## 2023-03-12 RX ORDER — POLYETHYLENE GLYCOL 3350 17 G/17G
17 POWDER, FOR SOLUTION ORAL DAILY PRN
Status: DISCONTINUED | OUTPATIENT
Start: 2023-03-12 | End: 2023-03-14 | Stop reason: HOSPADM

## 2023-03-12 RX ORDER — BUDESONIDE 0.5 MG/2ML
0.5 INHALANT ORAL 2 TIMES DAILY
Status: DISCONTINUED | OUTPATIENT
Start: 2023-03-12 | End: 2023-03-14 | Stop reason: HOSPADM

## 2023-03-12 RX ADMIN — CEFTRIAXONE 1000 MG: 1 INJECTION, POWDER, FOR SOLUTION INTRAMUSCULAR; INTRAVENOUS at 18:49

## 2023-03-12 RX ADMIN — SODIUM CHLORIDE, PRESERVATIVE FREE 10 ML: 5 INJECTION INTRAVENOUS at 21:00

## 2023-03-12 RX ADMIN — SODIUM CHLORIDE 100 ML/HR: 9 INJECTION, SOLUTION INTRAVENOUS at 04:53

## 2023-03-12 RX ADMIN — TAMSULOSIN HYDROCHLORIDE 0.4 MG: 0.4 CAPSULE ORAL at 18:07

## 2023-03-12 RX ADMIN — FAMOTIDINE 20 MG: 20 TABLET ORAL at 18:07

## 2023-03-12 ASSESSMENT — PAIN - FUNCTIONAL ASSESSMENT: PAIN_FUNCTIONAL_ASSESSMENT: NONE - DENIES PAIN

## 2023-03-12 ASSESSMENT — PAIN SCALES - GENERAL
PAINLEVEL_OUTOF10: 0
PAINLEVEL_OUTOF10: 0

## 2023-03-12 NOTE — ED NOTES
TRANSFER - OUT REPORT:    Verbal report given to [employer] RN(name) on Aurelia Garcia  being transferred to [facility] er (unit) for routine progression of care       Report consisted of patients Situation, Background, Assessment and   Recommendations(SBAR). Information from the following report(s) SBAR, ED Summary, Intake/Output, MAR and Recent Results was reviewed with the receiving nurse. Lines:   Peripheral IV 03/12/23 Anterior;Right Forearm (Active)        Opportunity for questions and clarification was provided.       Patient transported with:   IVF

## 2023-03-12 NOTE — PROGRESS NOTES
Contacted [facility] to arrange BLS transport of patient to [facility] ED. Spoke with Adelia Schaumann. Discussed patient condition, and need for transport. ETA is 0730. ED is aware.

## 2023-03-12 NOTE — ED PROVIDER NOTES
EMERGENCY DEPARTMENT HISTORY AND PHYSICAL EXAM    3:24 PM EDT seen on arrival in EMS stretcher        Date: 3/12/2023  Patient Name: Aubree Davis    History of Presenting Illness     Chief Complaint   Patient presents with    Hematuria     Transfer from [address] [place] [address] for Urology consult. Mathews placed at this facility         History Provided By: patient    Additional History (Context): Aubree Davis is a 77 y.o. male presents with bladder lift surgery February 14 by Dr Jadyn Askew, since then has had on and off hematuria, yesterday started with copious amounts presented to [facility] and transfer was arranged here to get urology service and bladder irrigation. He is not having any pain. No syncopal symptoms. Damari Medina PCP: MAYCOL Posada NP    Chief Complaint:   Duration:    Timing:    Location:   Quality:   Severity:   Modifying Factors:   Associated Symptoms:       No current facility-administered medications for this encounter.      Current Outpatient Medications   Medication Sig Dispense Refill    sulfamethoxazole-trimethoprim (BACTRIM DS;SEPTRA DS) 800-160 MG per tablet Take 1 tablet by mouth 2 times daily for 7 days 14 tablet 0    alfuzosin (UROXATRAL) 10 MG extended release tablet alfuzosin ER 10 mg tablet,extended release 24 hr (Patient not taking: Reported on 2/27/2023)      atorvastatin (LIPITOR) 80 MG tablet       Budeson-Glycopyrrol-Formoterol (BREZTRI AEROSPHERE) 160-9-4.8 MCG/ACT AERO  (Patient not taking: Reported on 2/27/2023)      cefdinir (OMNICEF) 300 MG capsule  (Patient not taking: Reported on 2/27/2023)      clopidogrel (PLAVIX) 75 MG tablet Now, then every 24 hours      diclofenac sodium (VOLTAREN) 1 % GEL  (Patient not taking: Reported on 2/27/2023)      finasteride (PROSCAR) 5 MG tablet Take 5 mg by mouth daily (Patient not taking: Reported on 2/27/2023)      TRELEGY ELLIPTA 100-62.5-25 MCG/ACT AEPB inhaler       LAGEVRIO 200 MG capsule  (Patient not taking: Reported on 2/27/2023)      oxybutynin (DITROPAN) 5 MG tablet  (Patient not taking: Reported on 2/27/2023)      oxyCODONE-acetaminophen (PERCOCET) 5-325 MG per tablet  (Patient not taking: Reported on 2/27/2023)      polyethylene glycol (GLYCOLAX) 17 GM/SCOOP powder Take 17 g by mouth daily (Patient not taking: Reported on 2/27/2023)      sildenafil (VIAGRA) 100 MG tablet Take 100 mg by mouth daily as needed (Patient not taking: Reported on 2/27/2023)      Cholecalciferol (VITAMIN D3) 1.25 MG (67808 UT) CAPS Take by mouth      alendronate (FOSAMAX) 35 MG tablet Take by mouth every 7 days (Patient not taking: Reported on 2/27/2023)      Biotin 2.5 MG CAPS Take by mouth (Patient not taking: Reported on 2/27/2023)      butalbital-APAP-caffeine -40 MG CAPS per capsule Take 2 capsules by mouth every 8 hours as needed (Patient not taking: Reported on 2/27/2023)      Calcium Carbonate-Vitamin D 600-3. 125 MG-MCG TABS Take by mouth (Patient not taking: Reported on 2/27/2023)      carbidopa-levodopa (SINEMET)  MG per tablet Take 1 tablet by mouth 3 times daily (Patient not taking: Reported on 2/27/2023)      Fluticasone Propionate, Inhal, 50 MCG/ACT AEPB Inhale into the lungs (Patient not taking: Reported on 2/27/2023)      gabapentin (NEURONTIN) 600 MG tablet Take by mouth 3 times daily. (Patient not taking: Reported on 2/27/2023)      mupirocin (BACTROBAN) 2 % cream Apply topically 2 times daily (Patient not taking: Reported on 2/27/2023)      progesterone (PROMETRIUM) 100 MG CAPS capsule Take 100 mg by mouth daily (Patient not taking: Reported on 2/27/2023)      tamsulosin (FLOMAX) 0.4 MG capsule Take 0.4 mg by mouth daily      traZODone (DESYREL) 50 MG tablet Take by mouth (Patient not taking: Reported on 2/27/2023)         Past History     Past Medical History:  No past medical history on file.     Past Surgical History:  Past Surgical History:   Procedure Laterality Date    UROLOGICAL SURGERY  02/14/2023 CYSTOSCOPY,MEDIAN LOBE TRANSURETHRAL RESECTION OF PROSTATE; UROLIFT 61 GM; Dr Clemens Poster       Family History:  No family history on file. Social History: Allergies:  No Known Allergies      Review of Systems     Review of Systems      Physical Exam       Patient Vitals for the past 12 hrs:   Temp Pulse Resp BP SpO2   03/12/23 1535 98.4 °F (36.9 °C) 75 16 (!) 128/97 99 %       IPVITALS  Patient Vitals for the past 24 hrs:   BP Temp Temp src Pulse Resp SpO2 Height Weight   03/12/23 1535 (!) 128/97 98.4 °F (36.9 °C) Oral 75 16 99 % 5' 11\" (1.803 m) 240 lb (108.9 kg)       Physical Exam  Constitutional:       General: He is not in acute distress. Appearance: He is not toxic-appearing. Cardiovascular:      Rate and Rhythm: Normal rate and regular rhythm. Comments: For intact and equal extremity pulses  Pulmonary:      Effort: Pulmonary effort is normal. No respiratory distress. Genitourinary:     Comments: Pink translucent urine in Mathews bag  Musculoskeletal:      Cervical back: Normal range of motion and neck supple. Neurological:      Mental Status: He is alert. Diagnostic Study Results   Labs -  No results found for this or any previous visit (from the past 24 hour(s)). Radiologic Studies -   No orders to display     [unfilled]    Medications ordered:   Medications - No data to display      Medical Decision Making   Initial Medical Decision Making and DDx:  Patient with gross hematuria patient of the urology service here for admission to the medical service and bladder irrigation and further intervention. Maria Elena hospitalist.    ED Course: Progress Notes, Reevaluation, and Consults:     4:17 PM EDT  Discussed with Dr. Sergio Jameson hospitalist, discussed patient with bladder lift procedure here for gross hematuria bladder irrigation and surgical intervention, agrees with admission. I will ensure three-way Mathews placed    I am the first provider for this patient.     I reviewed the vital signs, available nursing notes, past medical history, past surgical history, family history and social history. Patient Vitals for the past 12 hrs:   Temp Pulse Resp BP SpO2   03/12/23 1535 98.4 °F (36.9 °C) 75 16 (!) 128/97 99 %       Vital Signs-Reviewed the patient's vital signs. Pulse Oximetry Analysis, Cardiac Monitor, 12 lead ekg:      Interpreted by the EP. Records Reviewed: Nursing notes reviewed (Time of Review: 4:17 PM)    Procedures:   Critical Care Time: 0  If critical care time is note it is exclusive of any separately billable procedures. Aspirin: (was aspirin given for stroke?)    Diagnosis     Clinical Impression:   1.  Gross hematuria        Disposition: DISPOSITION Admitted 03/12/2023 04:17:07 PM       New for 2023:  DISCHARGE MEDICATIONS:  Current Discharge Medication List             Details   sulfamethoxazole-trimethoprim (BACTRIM DS;SEPTRA DS) 800-160 MG per tablet Take 1 tablet by mouth 2 times daily for 7 days  Qty: 14 tablet, Refills: 0      alfuzosin (UROXATRAL) 10 MG extended release tablet alfuzosin ER 10 mg tablet,extended release 24 hr      atorvastatin (LIPITOR) 80 MG tablet       Budeson-Glycopyrrol-Formoterol (BREZTRI AEROSPHERE) 160-9-4.8 MCG/ACT AERO       cefdinir (OMNICEF) 300 MG capsule       clopidogrel (PLAVIX) 75 MG tablet Now, then every 24 hours      diclofenac sodium (VOLTAREN) 1 % GEL       finasteride (PROSCAR) 5 MG tablet Take 5 mg by mouth daily      TRELEGY ELLIPTA 100-62.5-25 MCG/ACT AEPB inhaler       LAGEVRIO 200 MG capsule       oxybutynin (DITROPAN) 5 MG tablet       oxyCODONE-acetaminophen (PERCOCET) 5-325 MG per tablet       polyethylene glycol (GLYCOLAX) 17 GM/SCOOP powder Take 17 g by mouth daily      sildenafil (VIAGRA) 100 MG tablet Take 100 mg by mouth daily as needed      Cholecalciferol (VITAMIN D3) 1.25 MG (83108 UT) CAPS Take by mouth      alendronate (FOSAMAX) 35 MG tablet Take by mouth every 7 days      Biotin 2.5 MG CAPS Take by mouth butalbital-APAP-caffeine -40 MG CAPS per capsule Take 2 capsules by mouth every 8 hours as needed      Calcium Carbonate-Vitamin D 600-3. 125 MG-MCG TABS Take by mouth      carbidopa-levodopa (SINEMET)  MG per tablet Take 1 tablet by mouth 3 times daily      Fluticasone Propionate, Inhal, 50 MCG/ACT AEPB Inhale into the lungs      gabapentin (NEURONTIN) 600 MG tablet Take by mouth 3 times daily. mupirocin (BACTROBAN) 2 % cream Apply topically 2 times daily      progesterone (PROMETRIUM) 100 MG CAPS capsule Take 100 mg by mouth daily      tamsulosin (FLOMAX) 0.4 MG capsule Take 0.4 mg by mouth daily      traZODone (DESYREL) 50 MG tablet Take by mouth             DISCONTINUED MEDICATIONS:  Current Discharge Medication List          PATIENT REFERRED TO:  Follow Up with:  No follow-up provider specified.   _______________________________    Notes:    Frederic Wan MD using Dragon dictation      _______________________________     Fanny Riggins MD  03/12/23 7644

## 2023-03-12 NOTE — PROGRESS NOTES
Substitution Information per the P&T Committee approved Therapeutic Interchanges Policy     Medication Ordered Preferred Medication Dispensed    Trelegy Ellipta® (fluticasone/umeclidinium/vilanterol) 100/62.5/25 mcg daily Pulmicort Respules® (budesonide) 0.25mg/2ml BID  +  Arformoterol 15mcg/2ml BID    Ipratropium scheduled q8h

## 2023-03-12 NOTE — ED NOTES
Patient transferred from other hospital for a urology consult. Verbal order to remove present wilkerson and insert a three way wilkerson. Patient tolerated procedure well. New wilkerson 22 fr 30 cc balloon.        Hina Tyson RN  03/12/23 9080

## 2023-03-12 NOTE — ED NOTES
Pt pain assessed. Offered use of restroom and assistance as necessary. Pt's harden draining blood. Pt offered repositioning in bed for comfort. Assessed pt's ability to access possessions, everything within reach of pt. Pt reminded to use call bell as necessary, report any changes in status. Pt thanked for allowing care. Bed locked in lowest position, side rails up as necessary.

## 2023-03-12 NOTE — ED NOTES
Knocked on door to announce to pt. Hands washed. Introduced self to pt and updated whiteboard as available. Explained role of self to pt. ED flow explained to pt. Pt verbalized understanding.

## 2023-03-12 NOTE — ED NOTES
Catheter manually irrigated with return of punch colored urine with flecks of blood. approx 900ml drained from  Catheter. Patient states \" the pressure is relieved \" MD aware .  Wife at bedside

## 2023-03-12 NOTE — ED NOTES
Thrasher irrigated x1 with 50 ml since it had stopped but flowing well.  NSS stopped and changed to saline lock

## 2023-03-12 NOTE — ED PROVIDER NOTES
[facility] EMERGENCY DEP  EMERGENCY DEPARTMENT ENCOUNTER       Pt Name: Lisa Eugene  MRN: [id number]  [place] 1955  Date of evaluation: 3/11/2023  Provider: Kevin Evans MD   PCP: Deepak Cline NP  Note Started: 7:42 PM 3/11/23     CHIEF COMPLAINT       Chief Complaint   Patient presents with    Catheter Change        HISTORY OF PRESENT ILLNESS: 1 or more elements      History From: Patient  HPI Limitations : None     Lisa Eugene is a 77 y.o. male who presents ambulatory to the ER for difficulty urinating. Patient is status post bladder lift and has had multiple complications since then requiring prolonged Thrasher. He is on Plavix and has been having trouble with some bloody urine. He has been off his Plavix for a week and just had his Thrasher catheter removed last week and urology. They called him today and started him on Bactrim for infection from the urine sample that was obtained last week in clinic. Patient states he did take a dose of Plavix this morning to see what would happen and he noticed that this afternoon he started with bloody urine and he urinated out a clot but he states he was able to empty out his bladder completely. Approximately an hour ago he urinated and it was straight blood so he came to the emergency room. Here he was only able to dribble and is no longer able to get out any urine. He denies any lightheadedness, chest pain and shortness of breath with his symptoms. Nursing Notes were all reviewed and agreed with or any disagreements were addressed in the HPI. REVIEW OF SYSTEMS      Review of Systems   Constitutional:  Negative for activity change, appetite change, chills, fever and unexpected weight change. HENT:  Negative for congestion. Eyes:  Negative for pain and visual disturbance. Respiratory:  Negative for cough and shortness of breath. Cardiovascular:  Negative for chest pain.    Gastrointestinal:  Negative for abdominal pain, diarrhea, nausea and vomiting. Genitourinary:  Positive for difficulty urinating. Negative for dysuria. Musculoskeletal:  Negative for back pain. Skin:  Negative for rash. Neurological:  Negative for headaches. Positives and Pertinent negatives as per HPI. PAST HISTORY     Past Medical History:  Past Medical History:   Diagnosis Date    Hyperlipidemia        Past Surgical History:  No past surgical history on file. Family History:  No family history on file. Social History:  Social History     Tobacco Use    Smoking status: Never    Smokeless tobacco: Never   Substance Use Topics    Alcohol use: Yes       Allergies:  No Known Allergies    CURRENT MEDICATIONS      Previous Medications    ALENDRONATE (FOSAMAX) 35 MG TABLET    Take  by mouth every seven (7) days. ATORVASTATIN (LIPITOR) 40 MG TABLET    Take  by mouth daily. BIOTIN 2,500 MCG CAP    Take  by mouth. BUTALBITAL-ACETAMINOPHEN-CAFF (FIORICET) -40 MG PER CAPSULE    Take 2 Caps by mouth every eight (8) hours as needed for Pain. CALCIUM-CHOLECALCIFEROL, D3, (CALCIUM 600 + D) 600-125 MG-UNIT TAB    Take  by mouth. CARBIDOPA-LEVODOPA (SINEMET)  MG PER TABLET    Take 1 Tablet by mouth three (3) times daily. EZETIMIBE (ZETIA) 10 MG TABLET    Take  by mouth. FLUTICASONE PROPIONATE (FLOVENT DISKUS) 50 MCG/ACTUATION INHALER    Take  by inhalation. GABAPENTIN (NEURONTIN) 600 MG TABLET    Take  by mouth three (3) times daily. MUPIROCIN CALCIUM (BACTROBAN) 2 % TOPICAL CREAM    Apply  to affected area two (2) times a day. OMEGA 3-DHA-EPA-FISH OIL (FISH OIL) 100-160-1,000 MG CAP    Take  by mouth. PROGESTERONE (PROMETRIUM) 100 MG CAPSULE    Take 100 mg by mouth daily. SIMVASTATIN (ZOCOR) 10 MG TABLET    Take  by mouth nightly. TAMSULOSIN (FLOMAX) 0.4 MG CAPSULE    Take 0.4 mg by mouth daily. TRAZODONE (DESYREL) 50 MG TABLET    Take  by mouth nightly.      PHYSICAL EXAM      ED Triage Vitals [03/11/23 1925]   ED Encounter Vitals Group      BP (!) 177/75      Pulse (Heart Rate) 84      Resp Rate 24      Temp 98.3 °F (36.8 °C)      Temp src       O2 Sat (%) 98 %      Weight 240 lb      Height         Physical Exam  Vitals and nursing note reviewed. Constitutional:       Appearance: He is well-developed. He is not diaphoretic. Comments: Elderly male uncomfortable with elevated SBP   HENT:      Head: Normocephalic and atraumatic. Eyes:      General:         Right eye: No discharge. Left eye: No discharge. Conjunctiva/sclera: Conjunctivae normal.      Pupils: Pupils are equal, round, and reactive to light. Cardiovascular:      Rate and Rhythm: Normal rate and regular rhythm. Heart sounds: Normal heart sounds. No murmur heard. Pulmonary:      Effort: Pulmonary effort is normal. No respiratory distress. Breath sounds: Normal breath sounds. No wheezing or rales. Abdominal:      General: Bowel sounds are normal. There is no distension. Palpations: Abdomen is soft. Tenderness: There is no abdominal tenderness. Musculoskeletal:         General: Normal range of motion. Cervical back: Normal range of motion and neck supple. Skin:     General: Skin is warm and dry. Findings: No rash. Neurological:      Mental Status: He is alert and oriented to person, place, and time. Cranial Nerves: No cranial nerve deficit. Motor: No abnormal muscle tone.         DIAGNOSTIC RESULTS   LABS:     Recent Results (from the past 12 hour(s))   URINALYSIS W/ RFLX MICROSCOPIC    Collection Time: 03/11/23  8:48 PM   Result Value Ref Range    Color RED      Appearance HAZY (A) CLEAR      Specific gravity 1.010 1.003 - 1.030      pH (UA) 6.5 5.0 - 8.0      Protein 30 (A) NEG mg/dL    Glucose Negative NEG mg/dL    Ketone Negative NEG mg/dL    Bilirubin Negative NEG      Blood LARGE (A) NEG      Urobilinogen 0.2 0.2 - 1.0 EU/dL    Nitrites Negative NEG      Leukocyte Esterase Negative NEG URINE MICROSCOPIC ONLY    Collection Time: 03/11/23  8:48 PM   Result Value Ref Range    WBC 10-20 0 - 4 /hpf    RBC >100 (H) 0 - 5 /hpf    Epithelial cells FEW FEW /lpf    Bacteria Negative NEG /hpf    Mucus 1+ /lpf         PROCEDURES   Unless otherwise noted below, none  Procedures     CRITICAL CARE TIME   0    EMERGENCY DEPARTMENT COURSE and DIFFERENTIAL DIAGNOSIS/MDM   Vitals:    Vitals:    03/11/23 2028 03/11/23 2043 03/11/23 2145 03/11/23 2201   BP: (!) 118/53 (!) 146/66 130/71 138/70   Pulse:       Resp:       Temp:       SpO2: 97% 95% 95% 96%   Weight:            Patient was given the following medications:  Medications - No data to display    CONSULTS: (Who and What was discussed)  None    Chronic Conditions: BPH, Parkinson's, CAD on Plavix, high cholesterol    Social Determinants affecting Dx or Tx: None    Records Reviewed (source and summary of external notes): None    CC/HPI Summary, DDx, ED Course, and Reassessment: Elderly male presenting with difficulty urinating and hematuria at home with passing of clots. Currently hemodynamically stable without concern for hypovolemic shock but does appear comfortable. Bladder scan requested and will place Thrasher as needed. ED Course as of 03/11/23 2226   Sat Mar 11, 2023   2043 Thrasher placed with 900 mL of bloody urine output. Was manually irrigated with saline until clear. [JT]   2100 Thrasher originally draining clear after initial irrigation but now output again quite bloody. Will require further irrigation [JT]      ED Course User Index  [JT] Corky Garcia MD       Disposition Considerations (Tests not done, Shared Decision Making, Pt Expectation of Test or Tx.): Elderly male with recurrent urinary retention requiring Thrasher catheter. This is his third event requiring catheter placement but today having hematuria. Recommend he hold his Plavix for another week while having catheter in place especially as he is on the Bactrim.   Patient at home with leg bag and follow-up with urology on Monday    FINAL IMPRESSION     1. Urinary retention    2. Hematuria, unspecified type          DISPOSITION/PLAN       Discharge Note: The patient is stable for discharge home. The signs, symptoms, diagnosis, and discharge instructions have been discussed, understanding conveyed, and agreed upon. The patient is to follow up as recommended or return to ER should their symptoms worsen. PATIENT REFERRED TO:  Follow-up Information       Follow up With Specialties Details Why Contact Info    [address] [place] [address] Emergency Medicine  As needed [address] [address]  [phone]              DISCHARGE MEDICATIONS:  Current Discharge Medication List            DISCONTINUED MEDICATIONS:  Current Discharge Medication List          I am the Primary Clinician of Record. Jose Angel Espinal. MD Cristina (electronically signed)    (Please note that parts of this dictation were completed with voice recognition software. Quite often unanticipated grammatical, syntax, homophones, and other interpretive errors are inadvertently transcribed by the computer software. Please disregards these errors.  Please excuse any errors that have escaped final proofreading.)

## 2023-03-12 NOTE — ED NOTES
Thrasher catheter manually irrigated with 100 ml saline , return of 800ml red urine. No clots noted .  md gu

## 2023-03-12 NOTE — ED PROVIDER NOTES
[facility] EMERGENCY DEP  EMERGENCY DEPARTMENT ENCOUNTER       Pt Name: Vish Batres  MRN: [id number]  [place] 1955  Date of evaluation: 3/12/2023  Provider: Gideon Evans MD   PCP: Toni Nelson NP  Note Started: 4:31 AM 3/12/23     CHIEF COMPLAINT       Chief Complaint   Patient presents with    Urinary Catheter Problem        HISTORY OF PRESENT ILLNESS: 1 or more elements      History From: Patient and Patient's Wife  HPI Limitations : None     Vish Batres is a 77 y.o. male who presents ambulatory from home for difficulty urinating. Patient was here earlier last night had Thrasher catheter placed for urinary retention. Was discharged home around 11:00 and was doing fine until about an hour ago when he noticed he started to have pressure type lower abdominal pain. He went to the bathroom and realized his catheter was not draining so he pulled the catheter in and out of his penis back-and-forth multiple times until he had a large amount of blood clot drainage around the Thrasher catheter. HE said blood sprayed all over his bathroom. He states it did not not help anything, the catheter did not start draining so they came back to the ER for evaluation. Nursing Notes were all reviewed and agreed with or any disagreements were addressed in the HPI. REVIEW OF SYSTEMS      Review of Systems   Constitutional:  Negative for activity change, appetite change, chills, fever and unexpected weight change. HENT:  Negative for congestion. Eyes:  Negative for pain and visual disturbance. Respiratory:  Negative for cough and shortness of breath. Cardiovascular:  Negative for chest pain. Gastrointestinal:  Negative for abdominal pain, diarrhea, nausea and vomiting. Genitourinary:  Positive for difficulty urinating and hematuria. Negative for dysuria. Musculoskeletal:  Negative for back pain. Skin:  Negative for rash. Neurological:  Negative for headaches.       Positives and Pertinent negatives as per HPI. PAST HISTORY     Past Medical History:  Past Medical History:   Diagnosis Date    Hyperlipidemia        Past Surgical History:  No past surgical history on file. Family History:  No family history on file. Social History:  Social History     Tobacco Use    Smoking status: Never    Smokeless tobacco: Never   Substance Use Topics    Alcohol use: Yes       Allergies:  No Known Allergies    CURRENT MEDICATIONS      Previous Medications    ALENDRONATE (FOSAMAX) 35 MG TABLET    Take  by mouth every seven (7) days. ATORVASTATIN (LIPITOR) 40 MG TABLET    Take  by mouth daily. BIOTIN 2,500 MCG CAP    Take  by mouth. BUTALBITAL-ACETAMINOPHEN-CAFF (FIORICET) -40 MG PER CAPSULE    Take 2 Caps by mouth every eight (8) hours as needed for Pain. CALCIUM-CHOLECALCIFEROL, D3, (CALCIUM 600 + D) 600-125 MG-UNIT TAB    Take  by mouth. CARBIDOPA-LEVODOPA (SINEMET)  MG PER TABLET    Take 1 Tablet by mouth three (3) times daily. EZETIMIBE (ZETIA) 10 MG TABLET    Take  by mouth. FLUTICASONE PROPIONATE (FLOVENT DISKUS) 50 MCG/ACTUATION INHALER    Take  by inhalation. GABAPENTIN (NEURONTIN) 600 MG TABLET    Take  by mouth three (3) times daily. MUPIROCIN CALCIUM (BACTROBAN) 2 % TOPICAL CREAM    Apply  to affected area two (2) times a day. OMEGA 3-DHA-EPA-FISH OIL (FISH OIL) 100-160-1,000 MG CAP    Take  by mouth. PROGESTERONE (PROMETRIUM) 100 MG CAPSULE    Take 100 mg by mouth daily. SIMVASTATIN (ZOCOR) 10 MG TABLET    Take  by mouth nightly. TAMSULOSIN (FLOMAX) 0.4 MG CAPSULE    Take 0.4 mg by mouth daily. TRAZODONE (DESYREL) 50 MG TABLET    Take  by mouth nightly.        PHYSICAL EXAM      ED Triage Vitals [03/12/23 3229]   ED Encounter Vitals Group      BP (!) 178/84      Pulse (Heart Rate) 89      Resp Rate 22      Temp 98.2 °F (36.8 °C)      Temp src       O2 Sat (%) 98 %      Weight 240 lb      Height         Physical Exam  Vitals and nursing note reviewed. Constitutional:       Appearance: He is well-developed. He is not diaphoretic. Comments: Elderly male with elevated blood pressure. Uncomfortable in mild to moderate distress   HENT:      Head: Normocephalic and atraumatic. Eyes:      General:         Right eye: No discharge. Left eye: No discharge. Conjunctiva/sclera: Conjunctivae normal.      Pupils: Pupils are equal, round, and reactive to light. Cardiovascular:      Rate and Rhythm: Normal rate and regular rhythm. Heart sounds: Normal heart sounds. No murmur heard. Pulmonary:      Effort: Pulmonary effort is normal. No respiratory distress. Breath sounds: Normal breath sounds. No wheezing or rales. Abdominal:      General: Bowel sounds are normal. There is no distension. Palpations: Abdomen is soft. Tenderness: There is no abdominal tenderness. Genitourinary:     Comments: Thrasher catheter with approximately 300 mils of bright red bloodly urine  Musculoskeletal:         General: Normal range of motion. Cervical back: Normal range of motion and neck supple. Skin:     General: Skin is warm and dry. Findings: No rash. Neurological:      Mental Status: He is alert and oriented to person, place, and time. Cranial Nerves: No cranial nerve deficit. Motor: No abnormal muscle tone.         DIAGNOSTIC RESULTS   LABS:     Recent Results (from the past 12 hour(s))   URINALYSIS W/ RFLX MICROSCOPIC    Collection Time: 03/11/23  8:48 PM   Result Value Ref Range    Color RED      Appearance HAZY (A) CLEAR      Specific gravity 1.010 1.003 - 1.030      pH (UA) 6.5 5.0 - 8.0      Protein 30 (A) NEG mg/dL    Glucose Negative NEG mg/dL    Ketone Negative NEG mg/dL    Bilirubin Negative NEG      Blood LARGE (A) NEG      Urobilinogen 0.2 0.2 - 1.0 EU/dL    Nitrites Negative NEG      Leukocyte Esterase Negative NEG     URINE MICROSCOPIC ONLY    Collection Time: 03/11/23  8:48 PM   Result Value Ref Range WBC 10-20 0 - 4 /hpf    RBC >100 (H) 0 - 5 /hpf    Epithelial cells FEW FEW /lpf    Bacteria Negative NEG /hpf    Mucus 1+ /lpf   CBC WITH AUTOMATED DIFF    Collection Time: 03/12/23  4:45 AM   Result Value Ref Range    WBC 8.9 4.1 - 11.1 K/uL    RBC 4.46 4.10 - 5.70 M/uL    HGB 14.2 12.1 - 17.0 g/dL    HCT 41.4 36.6 - 50.3 %    MCV 92.8 80.0 - 99.0 FL    MCH 31.8 26.0 - 34.0 PG    MCHC 34.3 30.0 - 36.5 g/dL    RDW 14.1 11.5 - 14.5 %    PLATELET 916 191 - 631 K/uL    MPV 12.1 8.9 - 12.9 FL    NRBC 0.0 0.0  WBC    ABSOLUTE NRBC 0.00 0.00 - 0.01 K/uL    NEUTROPHILS 77 (H) 32 - 75 %    LYMPHOCYTES 11 (L) 12 - 49 %    MONOCYTES 9 5 - 13 %    EOSINOPHILS 2 0 - 7 %    BASOPHILS 1 0 - 1 %    IMMATURE GRANULOCYTES 0 0 - 0.5 %    ABS. NEUTROPHILS 6.8 1.8 - 8.0 K/UL    ABS. LYMPHOCYTES 0.9 0.8 - 3.5 K/UL    ABS. MONOCYTES 0.8 0.0 - 1.0 K/UL    ABS. EOSINOPHILS 0.2 0.0 - 0.4 K/UL    ABS. BASOPHILS 0.1 0.0 - 0.1 K/UL    ABS. IMM. GRANS. 0.0 0.00 - 0.04 K/UL    DF AUTOMATED     PROTHROMBIN TIME + INR    Collection Time: 03/12/23  4:45 AM   Result Value Ref Range    INR 1.0 0.9 - 1.1      Prothrombin time 10.0 9.0 - 88.7 sec   METABOLIC PANEL, COMPREHENSIVE    Collection Time: 03/12/23  4:45 AM   Result Value Ref Range    Sodium 137 136 - 145 mmol/L    Potassium 4.1 3.5 - 5.1 mmol/L    Chloride 100 97 - 108 mmol/L    CO2 28 21 - 32 mmol/L    Anion gap 9 5 - 15 mmol/L    Glucose 122 (H) 65 - 100 mg/dL    BUN 22 (H) 6 - 20 MG/DL    Creatinine 1.18 0.70 - 1.30 MG/DL    BUN/Creatinine ratio 19 12 - 20      eGFR >60 >60 ml/min/1.73m2    Calcium 9.2 8.5 - 10.1 MG/DL    Bilirubin, total 0.4 0.2 - 1.0 MG/DL    ALT (SGPT) 30 12 - 78 U/L    AST (SGOT) 19 15 - 37 U/L    Alk.  phosphatase 100 45 - 117 U/L    Protein, total 7.5 6.4 - 8.2 g/dL    Albumin 3.5 3.5 - 5.0 g/dL    Globulin 4.0 2.0 - 4.0 g/dL    A-G Ratio 0.9 (L) 1.1 - 2.2               PROCEDURES   Unless otherwise noted below, none  Procedures     CRITICAL CARE TIME CRITICAL CARE NOTE :  6:34 AM   IMPENDING DETERIORATION -Respiratory, Cardiovascular, CNS, Metabolic, Renal, and Hepatic  ASSOCIATED RISK FACTORS - Hypotension, Bleeding, Metabolic changes, and Dehydration  MANAGEMENT- Bedside Assessment, Supervision of Care, and Transfer  INTERPRETATION -  Blood Pressure  INTERVENTIONS - hemodynamic mngmt  CASE REVIEW - Medical Sub-Specialist, Nursing, and Family  TREATMENT RESPONSE -Stable  PERFORMED BY - Self    NOTES   :  I have spent 35 minutes of critical care time involved in lab review, consultations with specialist, family decision- making, bedside attention and documentation; time exculsive of EKG review/interpretation. During this entire length of time I was immediately available to the patient. Ondina Mehta. MD Cristina      EMERGENCY DEPARTMENT COURSE and DIFFERENTIAL DIAGNOSIS/MDM   Vitals:    Vitals:    03/12/23 0429 03/12/23 0445 03/12/23 0500   BP: (!) 178/84 (!) 140/77 (!) 145/72   Pulse: 89  78   Resp: 22  18   Temp: 98.2 °F (36.8 °C)     SpO2: 98%  96%   Weight: 108.9 kg (240 lb)          Patient was given the following medications:  Medications   sodium chloride (NS) flush 5-10 mL (has no administration in time range)   0.9% sodium chloride infusion (100 mL/hr IntraVENous New Bag 3/12/23 0458)       CONSULTS: (Who and What was discussed)  None    Chronic Conditions: BPH, CAD on plavix, HTN    Social Determinants affecting Dx or Tx: None    Records Reviewed (source and summary of external notes): Prior medical records, Previous Radiology studies, Previous Laboratory studies, and Nursing notes    CC/HPI Summary, DDx, ED Course, and Reassessment: Elderly male with urinary retention and hematuria. Urinalysis earlier without infection. Given continued bleeding with the story of blood all over his bathroom, request IV to be placed with lab draw and will transfer to OSH for CBI and urology.      ED Course as of 03/12/23 0634   Nicolas Jeffrey Mar 12, 2023   0500 Discussed case with Marciano Walker, [address] or [facility]. She recommends transfer to . Requests medicine admission with dry CT abdomen and pelvis. [JT]   [id number] Patient reevaluated and appears more comfortable. Blood pressure 140/77. Thrasher is draining Jama-Aid colored urine. [JT]   0533 CBC and chemistry reviewed without significant abnormality. [JT]   [id number] Continue to await call back from hospitalist. Call back to transfer center. Discussed case with Dr. Demi Baker. He accepts patient to the ER at [facility]. [JT]      ED Course User Index  [JT] Gris Evans., MD       FINAL IMPRESSION     1. Urinary retention    2. Gross hematuria          DISPOSITION/PLAN   Transferred to Another Facility    Discharge Note: The patient is stable for discharge home. The signs, symptoms, diagnosis, and discharge instructions have been discussed, understanding conveyed, and agreed upon. The patient is to follow up as recommended or return to ER should their symptoms worsen. I am the Primary Clinician of Record. Gris Cameron. MD Cristina (electronically signed)    (Please note that parts of this dictation were completed with voice recognition software. Quite often unanticipated grammatical, syntax, homophones, and other interpretive errors are inadvertently transcribed by the computer software. Please disregards these errors.  Please excuse any errors that have escaped final proofreading.)

## 2023-03-12 NOTE — TELEPHONE ENCOUNTER
Received consult from [facility] ED Dr. Jeet Elena   Pt: Andra Meyers MRN [id number]   51    Patient presented to the ED with AUR >800cc and GH s/p bladder lift by Dr. Irineo Guillen. Hx BPH difficult wilkerson only able to pass a 14Fr, was irrigated and DC home but went into retention again. Now only clots and domo blood in the catheter  They were able to manually irrigate and remove 400cc of urine. No ability for CBI and no urology. Need to transfer for Urology consultation and possible intervention. CT abd/pelvis ordered for clot burden, results pending.

## 2023-03-12 NOTE — ED NOTES
Checked on patient, patient resting comfortably with call bell and bed rails up. Will continue to monitor. Patient has been napping. Called Nursing Supervisor since [facility] is not here ETA was 0730. Now ETA 1130.  Patient updated

## 2023-03-12 NOTE — ED NOTES
Ate well. Thrasher continues to drain blood. Discussed repeat labs with provider. Attempted to contact Derrek victoria to update them but no answer.

## 2023-03-12 NOTE — ED NOTES
Awaiting [facility] patient anxious to get to the other facility. Thrasher continues to drain well. Patient drinking water.

## 2023-03-12 NOTE — DISCHARGE INSTRUCTIONS
You were seen again for urinary retention after UroLift procedure. Again you will need to follow-up with urology for catheter removal.  If you start to notice urinating around the catheter or severe cramping, please return to the emergency room as this can be a sign of clots blocking the catheter tubing.   Make sure to drink extra water the next few days

## 2023-03-13 ENCOUNTER — HOSPITAL ENCOUNTER (INPATIENT)
Facility: HOSPITAL | Age: 68
Discharge: HOME | DRG: 920 | End: 2023-03-16
Payer: MEDICARE

## 2023-03-13 LAB
ANION GAP SERPL CALC-SCNC: 6 MMOL/L (ref 3–18)
BASOPHILS # BLD: 0.1 K/UL (ref 0–0.1)
BASOPHILS NFR BLD: 1 % (ref 0–2)
BUN SERPL-MCNC: 10 MG/DL (ref 7–18)
BUN/CREAT SERPL: 12 (ref 12–20)
CALCIUM SERPL-MCNC: 8.8 MG/DL (ref 8.5–10.1)
CHLORIDE SERPL-SCNC: 109 MMOL/L (ref 100–111)
CO2 SERPL-SCNC: 24 MMOL/L (ref 21–32)
CREAT SERPL-MCNC: 0.84 MG/DL (ref 0.6–1.3)
DIFFERENTIAL METHOD BLD: ABNORMAL
EOSINOPHIL # BLD: 0.2 K/UL (ref 0–0.4)
EOSINOPHIL NFR BLD: 3 % (ref 0–5)
ERYTHROCYTE [DISTWIDTH] IN BLOOD BY AUTOMATED COUNT: 14.5 % (ref 11.6–14.5)
GLUCOSE BLD STRIP.AUTO-MCNC: 116 MG/DL (ref 70–110)
GLUCOSE BLD STRIP.AUTO-MCNC: 130 MG/DL (ref 70–110)
GLUCOSE BLD STRIP.AUTO-MCNC: 159 MG/DL (ref 70–110)
GLUCOSE SERPL-MCNC: 111 MG/DL (ref 74–99)
HCT VFR BLD AUTO: 38.5 % (ref 36–48)
HCT VFR BLD AUTO: 38.7 % (ref 36–48)
HGB BLD-MCNC: 12.8 G/DL (ref 13–16)
HGB BLD-MCNC: 12.9 G/DL (ref 13–16)
IMM GRANULOCYTES # BLD AUTO: 0 K/UL (ref 0–0.04)
IMM GRANULOCYTES NFR BLD AUTO: 0 % (ref 0–0.5)
LYMPHOCYTES # BLD: 0.9 K/UL (ref 0.9–3.6)
LYMPHOCYTES NFR BLD: 15 % (ref 21–52)
MCH RBC QN AUTO: 30.6 PG (ref 24–34)
MCHC RBC AUTO-ENTMCNC: 33.2 G/DL (ref 31–37)
MCV RBC AUTO: 92.1 FL (ref 78–100)
MONOCYTES # BLD: 0.7 K/UL (ref 0.05–1.2)
MONOCYTES NFR BLD: 12 % (ref 3–10)
NEUTS SEG # BLD: 4.1 K/UL (ref 1.8–8)
NEUTS SEG NFR BLD: 68 % (ref 40–73)
NRBC # BLD: 0 K/UL (ref 0–0.01)
NRBC BLD-RTO: 0 PER 100 WBC
PLATELET # BLD AUTO: 222 K/UL (ref 135–420)
PMV BLD AUTO: 11.7 FL (ref 9.2–11.8)
POTASSIUM SERPL-SCNC: 3.9 MMOL/L (ref 3.5–5.5)
RBC # BLD AUTO: 4.18 M/UL (ref 4.35–5.65)
SODIUM SERPL-SCNC: 139 MMOL/L (ref 136–145)
WBC # BLD AUTO: 6 K/UL (ref 4.6–13.2)

## 2023-03-13 PROCEDURE — 82962 GLUCOSE BLOOD TEST: CPT

## 2023-03-13 PROCEDURE — 94761 N-INVAS EAR/PLS OXIMETRY MLT: CPT

## 2023-03-13 PROCEDURE — 76770 US EXAM ABDO BACK WALL COMP: CPT

## 2023-03-13 PROCEDURE — 80048 BASIC METABOLIC PNL TOTAL CA: CPT

## 2023-03-13 PROCEDURE — 94640 AIRWAY INHALATION TREATMENT: CPT

## 2023-03-13 PROCEDURE — 2580000003 HC RX 258

## 2023-03-13 PROCEDURE — 99232 SBSQ HOSP IP/OBS MODERATE 35: CPT | Performed by: EMERGENCY MEDICINE

## 2023-03-13 PROCEDURE — 85025 COMPLETE CBC W/AUTO DIFF WBC: CPT

## 2023-03-13 PROCEDURE — 85014 HEMATOCRIT: CPT

## 2023-03-13 PROCEDURE — 6370000000 HC RX 637 (ALT 250 FOR IP)

## 2023-03-13 PROCEDURE — 1100000000 HC RM PRIVATE

## 2023-03-13 PROCEDURE — 36415 COLL VENOUS BLD VENIPUNCTURE: CPT

## 2023-03-13 PROCEDURE — 6360000002 HC RX W HCPCS

## 2023-03-13 RX ADMIN — TAMSULOSIN HYDROCHLORIDE 0.4 MG: 0.4 CAPSULE ORAL at 09:59

## 2023-03-13 RX ADMIN — ARFORMOTEROL TARTRATE 15 MCG: 15 SOLUTION RESPIRATORY (INHALATION) at 08:50

## 2023-03-13 RX ADMIN — BUDESONIDE 500 MCG: 0.5 INHALANT RESPIRATORY (INHALATION) at 08:50

## 2023-03-13 RX ADMIN — CEFTRIAXONE 1000 MG: 1 INJECTION, POWDER, FOR SOLUTION INTRAMUSCULAR; INTRAVENOUS at 17:35

## 2023-03-13 RX ADMIN — SODIUM CHLORIDE, PRESERVATIVE FREE 5 ML: 5 INJECTION INTRAVENOUS at 21:23

## 2023-03-13 RX ADMIN — IPRATROPIUM BROMIDE 0.5 MG: 0.5 SOLUTION RESPIRATORY (INHALATION) at 08:50

## 2023-03-13 RX ADMIN — FAMOTIDINE 20 MG: 20 TABLET ORAL at 09:59

## 2023-03-13 RX ADMIN — ATORVASTATIN CALCIUM 80 MG: 40 TABLET, FILM COATED ORAL at 21:20

## 2023-03-13 RX ADMIN — SODIUM CHLORIDE, PRESERVATIVE FREE 10 ML: 5 INJECTION INTRAVENOUS at 10:00

## 2023-03-13 ASSESSMENT — PAIN SCALES - GENERAL
PAINLEVEL_OUTOF10: 0

## 2023-03-13 NOTE — PROGRESS NOTES
Respiratory Note:    Held 2000 breathing tx's. Patient transferred from ED. At home he is currently prescribed Trelegy that he admitted to the nurse he administered prior to coming to [facility].      No signs of respiratory distress noted

## 2023-03-13 NOTE — PROGRESS NOTES
[facility]  Progress Note    Patient: Slim Perez Age: 77 y.o. : 1955 MR#: [id number] SSN: [id number]  Date/Time: 3/13/2023    Subjective:     Patient is sitting in bed in no apparent distress, awake and alert. Denies any abdominal pain.   Wife at bedside    Assessment/Plan:     Gross hematuria, recent UroLift procedure  Acute cystitis  Intermittent urinary retention  COPD without exacerbation  History of CVA in the past-was on Plavix  Dyslipidemia    Plan  Urology is following, hematuria seems to be clearing  Plavix on hold  Bronchial hygiene, bronchodilators  Continue atorvastatin  Discussed with patient and wife at bedside    Case discussed with:  [x]Patient  [x]Family  []Nursing  []Case Management  DVT Prophylaxis:  []Lovenox  []Hep SQ  [x]SCDs  []Coumadin   []On Heparin gtt    Objective:   VS: /67   Pulse 82   Temp 97.4 °F (36.3 °C) (Oral)   Resp 22   Ht 5' 11\" (1.803 m)   Wt 240 lb (108.9 kg)   SpO2 98%   BMI 33.47 kg/m²    Tmax/24hrs: Temp (24hrs), Av °F (36.7 °C), Min:97.4 °F (36.3 °C), Max:98.7 °F (37.1 °C)    Input/Output:   Intake/Output Summary (Last 24 hours) at 3/13/2023 1901  Last data filed at 3/13/2023 1744  Gross per 24 hour   Intake 10 ml   Output 2150 ml   Net -2140 ml       General:  Awake, alert  Cardiovascular:  S1S2+, RRR  Pulmonary:  CTA b/l  GI:  Soft, BS+, NT, ND  Extremities:  No edema  Mathews in place    Labs:    Recent Results (from the past 24 hour(s))   Basic Metabolic Panel w/ Reflex to MG    Collection Time: 23  2:20 AM   Result Value Ref Range    Sodium 139 136 - 145 mmol/L    Potassium 3.9 3.5 - 5.5 mmol/L    Chloride 109 100 - 111 mmol/L    CO2 24 21 - 32 mmol/L    Anion Gap 6 3.0 - 18 mmol/L    Glucose 111 (H) 74 - 99 mg/dL    BUN 10 7.0 - 18 MG/DL    Creatinine 0.84 0.6 - 1.3 MG/DL    Bun/Cre Ratio 12 12 - 20      Est, Glom Filt Rate >60 >60 ml/min/1.73m2    Calcium 8.8 8.5 - 10.1 MG/DL   CBC with Auto Differential    Collection Time: 03/13/23  2:20 AM   Result Value Ref Range    WBC 6.0 4.6 - 13.2 K/uL    RBC 4.18 (L) 4.35 - 5.65 M/uL    Hemoglobin 12.8 (L) 13.0 - 16.0 g/dL    Hematocrit 38.5 36.0 - 48.0 %    MCV 92.1 78.0 - 100.0 FL    MCH 30.6 24.0 - 34.0 PG    MCHC 33.2 31.0 - 37.0 g/dL    RDW 14.5 11.6 - 14.5 %    Platelets 405 291 - 265 K/uL    MPV 11.7 9.2 - 11.8 FL    Nucleated RBCs 0.0 0  WBC    nRBC 0.00 0.00 - 0.01 K/uL    Seg Neutrophils 68 40 - 73 %    Lymphocytes 15 (L) 21 - 52 %    Monocytes 12 (H) 3 - 10 %    Eosinophils % 3 0 - 5 %    Basophils 1 0 - 2 %    Immature Granulocytes 0 0.0 - 0.5 %    Segs Absolute 4.1 1.8 - 8.0 K/UL    Absolute Lymph # 0.9 0.9 - 3.6 K/UL    Absolute Mono # 0.7 0.05 - 1.2 K/UL    Absolute Eos # 0.2 0.0 - 0.4 K/UL    Basophils Absolute 0.1 0.0 - 0.1 K/UL    Absolute Immature Granulocyte 0.0 0.00 - 0.04 K/UL    Differential Type AUTOMATED     POCT Glucose    Collection Time: 03/13/23  8:06 AM   Result Value Ref Range    POC Glucose 116 (H) 70 - 110 mg/dL   Hemoglobin and Hematocrit    Collection Time: 03/13/23  9:12 AM   Result Value Ref Range    Hemoglobin 12.9 (L) 13.0 - 16.0 g/dL    Hematocrit 38.7 36.0 - 48.0 %   POCT Glucose    Collection Time: 03/13/23 11:23 AM   Result Value Ref Range    POC Glucose 130 (H) 70 - 110 mg/dL   POCT Glucose    Collection Time: 03/13/23  4:33 PM   Result Value Ref Range    POC Glucose 159 (H) 70 - 110 mg/dL     Additional Data Reviewed:    Dragon medical dictation software was used for portions of this report. Unintended errors may occur.       Signed By: Dimas Justice MD     March 13, 2023 7:01 PM

## 2023-03-13 NOTE — PROGRESS NOTES
OT order received and chart reviewed. OT screen completed with pt who states he is at functional baseline and independent in ambulating and getting to his bathroom in room. Pt declines OT services. Will sign off.  Thank you, Edilia Apple OTRL

## 2023-03-13 NOTE — PROGRESS NOTES
conducted an initial consultation and Spiritual Assessment for Lane Juarez, who is a 77 y.o.,male. Patient's Primary Language is: [place]. According to the patient's EMR Confucianist Affiliation is: Maria De Jesus Stewart. The reason the Patient came to the hospital is:   Patient Active Problem List    Diagnosis Date Noted    Gross hematuria 03/12/2023    History of CVA (cerebrovascular accident) without residual deficits 03/12/2023    History of COPD 03/12/2023        The  provided the following Interventions:  Initiated a relationship of care and support. Listened empathically. Provided information about Spiritual Care Services. Offered prayer and assurance of continued prayers on patient's behalf. Chart reviewed. The following outcomes where achieved:  Patient not interested in completing an Advance Medical Directive at this time. Patient shared limited information about both their medical narrative and spiritual journey/beliefs.  confirmed Patient's Confucianist Affiliation. Patient expressed gratitude for 's visit. Assessment:  Patient does not have any Yazidism/cultural needs that will affect patient's preferences in health care. Plan:  Chaplains will continue to follow and will provide pastoral care on an as needed/requested basis.  recommends bedside caregivers page  on duty if patient shows signs of acute spiritual or emotional distress.     [address]   [phone]

## 2023-03-13 NOTE — CONSULTS
1. Gross hematuria        ASSESSMENT:   Gross Hematuria  H/o BPH with LUTS  H/o AUR with Gross hematuria following UroLift/TURP   S/p TURP of median lobe and UroLift #6 on 2/14/2023   Creat WNL   Hgb 12.8>12.1   UCX 2/27/23: >10 K mixed, Stenotrophomonas Maltophilia   UCX 3/12/23: Pending      H/o COPD  CVA  HLD    PLAN:    CT impression reviewed, clot in bladder vs tumor. Patient just underwent urological procedure last month. Unlikely patient has grown neoplasm in 1 month. 22 fr 3 way in place with clear, yellow urine. Hand irrigated- obtained clear, yellow urine. No clots. Will obtain RIGOBERTO to make sure no clots remain in bladder. Plan for VT in AM if RIGOBERTO negative for clots. Ok to eat. No surgical procedure needed. Monitor H&H  Hold all AC is able. Follow UCX, continue Rocephin. Continue Flomax  Will follow. Follow up arranged? No, pending course      Nahomi Godinez PA-C  [facility]  Available M-Fri [facility]  Pager: [phone]     Patient seen and examined. History and physical reviewed. Agree with above. I have made any additional changes. Urine clear yellow. Voiding trial in am. RIGOBERTO planned for residual clot burden. Donna Mehta M.D. Urology of [address] [place] [address]    [address] [facility], [address] [place] [address]   Phone: [phone]    Fax: [address] [facility], [address] [place] [address],[address]  [place], [address] [place] [address] Drive  P: [phone]   F: [phone]      Chief Complaint   Patient presents with    Hematuria     Transfer from [address] [place] [address] for Urology consult. Mathews placed at this facility       HISTORY OF PRESENT ILLNESS:  Ashley Ansari is a 77 y.o. male who is seen in consultation as referred by   for gross hematuria. Past urological history significant for BPH with LUTS, urinary retention with gross hematuria s/p Urolift/TURP.  Patient last seen on 3/2/23 by Dr. Maureen Ulrich     Patient with PMHX significant for COPD, CVA, presented to OSH ED for hematuria. He has had intermittent hematuria with retention since procedure on 2/14. Patient started on Bactrim BID Friday for positive urine culture. CT obtained, shows blood within urinary bladder. Patient transferred for [facility] for urological consultation. Three way placed on arrival to [facility] ED. Patient was on Plavix, advised to stop and start ASA. Patient says he had gross hematuria before after procedure but that it would clear up. He says he tried to take a Plavix on Sunday and believes that is what caused his hematuria. Denies urinary symptoms prior. Patient says he had gross hematuria and then went into retention due to blood clots.         [facility] CLINCIAL VISIT 3/13/2023   Pain Assessment 0-10   Pain Level 0   Patient's Stated Pain Goal 0 - No pain   POSS Score -         Past Medical History:   Diagnosis Date    COPD (chronic obstructive pulmonary disease) (HCC)     CVA (cerebral vascular accident) [facility])        Past Surgical History:   Procedure Laterality Date    UROLOGICAL SURGERY  02/14/2023    CYSTOSCOPY,MEDIAN LOBE TRANSURETHRAL RESECTION OF PROSTATE; UROLIFT 61 GM; Dr Karan Palacios            No Known Allergies    Family History   Problem Relation Age of Onset    Heart Attack Father        Current Facility-Administered Medications   Medication Dose Route Frequency Provider Last Rate Last Admin    atorvastatin (LIPITOR) tablet 80 mg  80 mg Oral Nightly Bernis Roll, APRN - NP        tamsulosin (FLOMAX) capsule 0.4 mg  0.4 mg Oral Daily Bernis Roll, APRN - NP   0.4 mg at 03/12/23 1807    sodium chloride flush 0.9 % injection 5-40 mL  5-40 mL IntraVENous 2 times per day Bernis Roll, APRN - NP   10 mL at 03/12/23 2100    sodium chloride flush 0.9 % injection 5-40 mL  5-40 mL IntraVENous PRN Bernis Roll, APRN - NP        ondansetron (ZOFRAN-ODT) disintegrating tablet 4 mg  4 mg Oral Q8H PRN Bernis Roll, APRN - NP        Or    ondansetron [facility]) injection 4 mg 4 mg IntraVENous Q6H PRN Alicia Noun, APRN - NP        polyethylene glycol (GLYCOLAX) packet 17 g  17 g Oral Daily PRN Alicia Noun, APRN - NP        famotidine (PEPCID) tablet 20 mg  20 mg Oral Daily Maria Teresaie Noun, APRN - NP   20 mg at 03/12/23 1807    acetaminophen (TYLENOL) tablet 650 mg  650 mg Oral Q6H PRN Maria Teresaie Noun, APRN - NP        Or    acetaminophen (TYLENOL) suppository 650 mg  650 mg Rectal Q6H PRN Maria Teresaie Noun, APRN - NP        ipratropium-albuterol (DUONEB) nebulizer solution 1 ampule  1 ampule Inhalation Q4H PRN Alicia Noun, APRN - NP        budesonide (PULMICORT) nebulizer suspension 500 mcg  0.5 mg Nebulization BID Alicia Noun, APRN - NP        And    arformoterol tartrate (BROVANA) nebulizer solution 15 mcg  15 mcg Nebulization BID Alicia Giles, APRN - NP        ipratropium (ATROVENT) 0.02 % nebulizer solution 0.5 mg  0.5 mg Nebulization Q8H Alicia Noun, APRN - NP        cefTRIAXone (ROCEPHIN) 1,000 mg in sterile water 10 mL IV syringe  1,000 mg IntraVENous Q24H Alicia Guillaumeun, APRN - NP   1,000 mg at 03/12/23 1849       Review of Systems  ROS is:        Negative for: Ophthalmologic issues, ENT issues, Cardiovascular issues, respiratory issues, GI issues, neurologic issues, hematoogic issues, skin lesions, musculoskeletal issues, psychiatric issues  Exceptions: Yes    Positive for:    Hematuria, retention          PHYSICAL EXAMINATION:   /85   Pulse 74   Temp 98.7 °F (37.1 °C) (Oral)   Resp 16   Ht 5' 11\" (1.803 m)   Wt 240 lb (108.9 kg)   SpO2 93%   BMI 33.47 kg/m²   Constitutional: Well developed, well nourished male. No acute distress. HEENT: Normocephalic, Atraumatic, EOM's intact   CV:  no edema  Respiratory: No respiratory distress or difficulties breathing   Abdomen:  soft and non tender    Male:  No CVA tenderness  SCROTUM:  No scrotal rash or lesions noticed. Normal bilateral testes and epididymis.    PENIS: Urethral meatus normal in location and size. No urethral discharge. Circumsized . 22 fr 3 way in place with clear, yellow urine. Skin: No evidence of jaundice. Normal color  Neuro/Psych:  Alert and oriented. Affect appropriate. REVIEW OF LABS AND IMAGING:   Unable to review images. CT 3/12/23:  IMPRESSION   Hyperdense blood within urinary bladder with indwelling Mathews   catheter and possible posterior left bladder wall mass versus adherent clot. Incidentals as above. Labs: Results:   Chemistry    Recent Labs     03/12/23  1655 03/13/23  0220    139   K 3.8 3.9    109   CO2 27 24   BUN 14 10   GLOB 3.8  --       CBC w/Diff Recent Labs     03/12/23  1655 03/13/23  0220   WBC 6.7 6.0   RBC 3.86* 4.18*   HGB 12.1* 12.8*   HCT 36.0 38.5    222      Cultures Invalid input(s): CULT  [unfilled]      Urinalysis Potassium   Date Value Ref Range Status   03/13/2023 3.9 3.5 - 5.5 mmol/L Final     BUN   Date Value Ref Range Status   03/13/2023 10 7.0 - 18 MG/DL Final      PSA No results for input(s): PSA in the last 72 hours.    Coagulation No results found for: INR, APTT

## 2023-03-13 NOTE — PROGRESS NOTES
PT orders received and chart reviewed. PT eval attempted at 0920, pt reporting ambulating independently with no questions or concerns regarding mobility at this time. Discussed if change in mobility to inform physician for new PT order. Will sign off.     Thank you for this referral  Alena Gonzalez  PT DPT

## 2023-03-13 NOTE — PLAN OF CARE
Problem: Pain  Goal: Verbalizes/displays adequate comfort level or baseline comfort level  Outcome: Progressing  Flowsheets (Taken 3/13/2023 1430)  Verbalizes/displays adequate comfort level or baseline comfort level:   Encourage patient to monitor pain and request assistance   Assess pain using appropriate pain scale   Administer analgesics based on type and severity of pain and evaluate response

## 2023-03-14 VITALS
WEIGHT: 240 LBS | TEMPERATURE: 98.8 F | BODY MASS INDEX: 33.6 KG/M2 | OXYGEN SATURATION: 93 % | HEART RATE: 70 BPM | RESPIRATION RATE: 18 BRPM | HEIGHT: 71 IN | SYSTOLIC BLOOD PRESSURE: 129 MMHG | DIASTOLIC BLOOD PRESSURE: 69 MMHG

## 2023-03-14 LAB
ANION GAP SERPL CALC-SCNC: 5 MMOL/L (ref 3–18)
BACTERIA SPEC CULT: ABNORMAL
BASOPHILS # BLD: 0.1 K/UL (ref 0–0.1)
BASOPHILS NFR BLD: 1 % (ref 0–2)
BUN SERPL-MCNC: 17 MG/DL (ref 7–18)
BUN/CREAT SERPL: 19 (ref 12–20)
CALCIUM SERPL-MCNC: 8.6 MG/DL (ref 8.5–10.1)
CC UR VC: ABNORMAL
CHLORIDE SERPL-SCNC: 106 MMOL/L (ref 100–111)
CO2 SERPL-SCNC: 25 MMOL/L (ref 21–32)
CREAT SERPL-MCNC: 0.91 MG/DL (ref 0.6–1.3)
DIFFERENTIAL METHOD BLD: ABNORMAL
EOSINOPHIL # BLD: 0.3 K/UL (ref 0–0.4)
EOSINOPHIL NFR BLD: 5 % (ref 0–5)
ERYTHROCYTE [DISTWIDTH] IN BLOOD BY AUTOMATED COUNT: 14.3 % (ref 11.6–14.5)
GLUCOSE SERPL-MCNC: 115 MG/DL (ref 74–99)
HCT VFR BLD AUTO: 38.5 % (ref 36–48)
HGB BLD-MCNC: 12.6 G/DL (ref 13–16)
IMM GRANULOCYTES # BLD AUTO: 0 K/UL (ref 0–0.04)
IMM GRANULOCYTES NFR BLD AUTO: 0 % (ref 0–0.5)
LYMPHOCYTES # BLD: 1.2 K/UL (ref 0.9–3.6)
LYMPHOCYTES NFR BLD: 16 % (ref 21–52)
MAGNESIUM SERPL-MCNC: 2.3 MG/DL (ref 1.6–2.6)
MCH RBC QN AUTO: 30.9 PG (ref 24–34)
MCHC RBC AUTO-ENTMCNC: 32.7 G/DL (ref 31–37)
MCV RBC AUTO: 94.4 FL (ref 78–100)
MONOCYTES # BLD: 0.9 K/UL (ref 0.05–1.2)
MONOCYTES NFR BLD: 12 % (ref 3–10)
NEUTS SEG # BLD: 4.8 K/UL (ref 1.8–8)
NEUTS SEG NFR BLD: 66 % (ref 40–73)
NRBC # BLD: 0 K/UL (ref 0–0.01)
NRBC BLD-RTO: 0 PER 100 WBC
PLATELET # BLD AUTO: 212 K/UL (ref 135–420)
PMV BLD AUTO: 11.2 FL (ref 9.2–11.8)
POTASSIUM SERPL-SCNC: 4.2 MMOL/L (ref 3.5–5.5)
RBC # BLD AUTO: 4.08 M/UL (ref 4.35–5.65)
SERVICE CMNT-IMP: ABNORMAL
SODIUM SERPL-SCNC: 136 MMOL/L (ref 136–145)
WBC # BLD AUTO: 7.3 K/UL (ref 4.6–13.2)

## 2023-03-14 PROCEDURE — 2580000003 HC RX 258

## 2023-03-14 PROCEDURE — 80048 BASIC METABOLIC PNL TOTAL CA: CPT

## 2023-03-14 PROCEDURE — 6370000000 HC RX 637 (ALT 250 FOR IP)

## 2023-03-14 PROCEDURE — 85025 COMPLETE CBC W/AUTO DIFF WBC: CPT

## 2023-03-14 PROCEDURE — 36415 COLL VENOUS BLD VENIPUNCTURE: CPT

## 2023-03-14 PROCEDURE — 6360000002 HC RX W HCPCS

## 2023-03-14 PROCEDURE — 83735 ASSAY OF MAGNESIUM: CPT

## 2023-03-14 PROCEDURE — 94761 N-INVAS EAR/PLS OXIMETRY MLT: CPT

## 2023-03-14 PROCEDURE — 94640 AIRWAY INHALATION TREATMENT: CPT

## 2023-03-14 RX ORDER — CIPROFLOXACIN 500 MG/1
500 TABLET, FILM COATED ORAL 2 TIMES DAILY
Qty: 14 TABLET | Refills: 0 | Status: SHIPPED | OUTPATIENT
Start: 2023-03-14 | End: 2023-03-21

## 2023-03-14 RX ORDER — L. ACIDOPHILUS,CASEI,RHAMNOSUS 50B CELL
1 CAPSULE,DELAYED RELEASE (ENTERIC COATED) ORAL
Qty: 7 CAPSULE | Refills: 0 | Status: SHIPPED | OUTPATIENT
Start: 2023-03-14 | End: 2023-03-21

## 2023-03-14 RX ADMIN — BUDESONIDE 500 MCG: 0.5 INHALANT RESPIRATORY (INHALATION) at 08:37

## 2023-03-14 RX ADMIN — TAMSULOSIN HYDROCHLORIDE 0.4 MG: 0.4 CAPSULE ORAL at 08:37

## 2023-03-14 RX ADMIN — ARFORMOTEROL TARTRATE 15 MCG: 15 SOLUTION RESPIRATORY (INHALATION) at 08:36

## 2023-03-14 RX ADMIN — IPRATROPIUM BROMIDE 0.5 MG: 0.5 SOLUTION RESPIRATORY (INHALATION) at 15:16

## 2023-03-14 RX ADMIN — IPRATROPIUM BROMIDE 0.5 MG: 0.5 SOLUTION RESPIRATORY (INHALATION) at 08:36

## 2023-03-14 RX ADMIN — SODIUM CHLORIDE, PRESERVATIVE FREE 10 ML: 5 INJECTION INTRAVENOUS at 08:37

## 2023-03-14 RX ADMIN — FAMOTIDINE 20 MG: 20 TABLET ORAL at 08:36

## 2023-03-14 ASSESSMENT — PAIN SCALES - GENERAL
PAINLEVEL_OUTOF10: 0

## 2023-03-14 NOTE — PLAN OF CARE
Problem: Pain  Goal: Verbalizes/displays adequate comfort level or baseline comfort level  3/14/2023 0153 by Agnes Eisenberg RN  Outcome: Progressing  3/13/2023 1430 by Althea Keane RN  Outcome: Progressing  Flowsheets (Taken 3/13/2023 1430)  Verbalizes/displays adequate comfort level or baseline comfort level:   Encourage patient to monitor pain and request assistance   Assess pain using appropriate pain scale   Administer analgesics based on type and severity of pain and evaluate response

## 2023-03-14 NOTE — DISCHARGE INSTRUCTIONS
DISCHARGE SUMMARY from Nurse    PATIENT INSTRUCTIONS:    After general anesthesia or intravenous sedation, for 24 hours or while taking prescription Narcotics:  Limit your activities  Do not drive and operate hazardous machinery  Do not make important personal or business decisions  Do  not drink alcoholic beverages  If you have not urinated within 8 hours after discharge, please contact your surgeon on call. Report the following to your surgeon:  Excessive pain, swelling, redness or odor of or around the surgical area  Temperature over 100.5  Nausea and vomiting lasting longer than 4 hours or if unable to take medications  Any signs of decreased circulation or nerve impairment to extremity: change in color, persistent  numbness, tingling, coldness or increase pain  Any questions    What to do at Home:  Recommended activity: activity as tolerated. If you experience any of urinary symptoms such as difficulty urinating or blood in the urine, please follow up with your primary care provider. *  Please give a list of your current medications to your Primary Care Provider. *  Please update this list whenever your medications are discontinued, doses are      changed, or new medications (including over-the-counter products) are added. *  Please carry medication information at all times in case of emergency situations. These are general instructions for a healthy lifestyle:    No smoking/ No tobacco products/ Avoid exposure to second hand smoke  Surgeon General's Warning:  Quitting smoking now greatly reduces serious risk to your health.     Obesity, smoking, and sedentary lifestyle greatly increases your risk for illness    A healthy diet, regular physical exercise & weight monitoring are important for maintaining a healthy lifestyle    You may be retaining fluid if you have a history of heart failure or if you experience any of the following symptoms:  Weight gain of 3 pounds or more overnight or 5 pounds in a week, increased swelling in our hands or feet or shortness of breath while lying flat in bed. Please call your doctor as soon as you notice any of these symptoms; do not wait until your next office visit. The discharge information has been reviewed with the patient. The patient verbalized understanding. Discharge medications reviewed with the patient and appropriate educational materials and side effects teaching were provided.   ___________________________________________________________________________________________________________________________________

## 2023-03-14 NOTE — PROGRESS NOTES
Urology paged due to orders for voiding trial, but no order to d/c wilkerson. This nurse paged to get order to d/c wilkerson. Due to no response from urology after 2 pages this nurse paged Dr. Sailaja Reynaga on perfect serve for order.

## 2023-03-14 NOTE — PROGRESS NOTES
Patient wife gave another number to reach their daughter due to daughter not receiving calls that Urology has made this morning. The number provided is the daughters , Morenita Damian [phone]. Urology paged with updated phone number.

## 2023-03-14 NOTE — PROGRESS NOTES
1340 Patient wilkerson removed. Patient tolerated well. Dueto void at 1630. Patient to call if any pain, discomfort, urgency, or difficulty trying to void. This nurse educated patient of bladder scans and monitoring urine output. Patient verbalized understanding. Wife at bedside during this time.

## 2023-03-14 NOTE — CARE COORDINATION
03/14/23 1327   Service Assessment   Patient Orientation Alert and Oriented   Cognition Alert   History Provided By Patient   Primary Caregiver Self   Accompanied By/Relationship pt's spouse Mono López Spouse/Significant Other   PCP Verified by CM Yes   Last Visit to PCP Within last 3 months   Prior Functional Level Independent in ADLs/IADLs   Current Functional Level Independent in ADLs/IADLs   Can patient return to prior living arrangement Yes   Ability to make needs known: Good   Family able to assist with home care needs: Yes   Would you like for me to discuss the discharge plan with any other family members/significant others, and if so, who? Yes  (pt's spouse, Idania Gee)   Financial Resources [employer] Resources None   Social/Functional History   Lives With Spouse   Type of [address] One level   Home Access Stairs to enter with rails   Entrance Stairs - Number of Steps 2   Bathroom Shower/Tub Tub/Shower unit   Bathroom Accessibility Accessible   ADL Assistance Independent   Homemaking Assistance Independent   Homemaking Responsibilities No   Ambulation Assistance Independent   Transfer Assistance Independent   Active  Yes   Mode of Transportation Car   Education College Graduate   Occupation Retired   Type of Occupation Retired   Discharge Planning   Type of [address] Prior To Admission None   Potential Assistance Needed N/A   DME Ordered? No   Potential Assistance Purchasing Medications No   Type of Home Care Services None   Patient expects to be discharged to: House   One/Two Story Residence One story   History of falls? 0   Services At/After Discharge   Transition of Care Consult (CM Consult) Discharge [facility] Discharge None    Resource Information Provided?  No   Mode of Transport at Discharge Other (see comment)  (pt's wife will provide transport at [facility])   Confirm Follow Up Transport Family  (pt's wife will provide transport at [facility])   Condition of Participation: Discharge Planning   The Plan for Transition of Care is related to the following treatment goals: Home with family Assistance   Freedom of Choice list was provided with basic dialogue that supports the patient's individualized plan of care/goals, treatment preferences, and shares the quality data associated with the providers? No     Case Management Assessment  Initial Evaluation    Date/Time of Evaluation: 3/14/2023 1:31 PM  Assessment Completed by: Hillary Truong    If patient is discharged prior to next notation, then this note serves as note for discharge by case management. Patient Name: Slim Perez                   YOB: 1955  Diagnosis: Gross hematuria [R31.0]                   Date / Time: 3/12/2023  3:23 PM    Patient Admission Status: Inpatient   Readmission Risk (Low < 19, Mod (19-27), High > 27): Readmission Risk Score: 12.1    Current PCP: MAYCOL Maguire NP  PCP verified by CM? (P) Yes    Chart Reviewed: Yes      History Provided by: (P) Patient  Patient Orientation: (P) Alert and Oriented    Patient Cognition: (P) Alert    Hospitalization in the last 30 days (Readmission):  No    If yes, Readmission Assessment in CM Navigator will be completed.     Advance Directives:      Code Status: Full Code   Patient's Primary Decision Maker is:        Discharge Planning:    Patient lives with: (P) Spouse/Significant Other Type of Home: (P) House  Primary Care Giver: (P) Self  Patient Support Systems include: (P) Spouse/Significant Other   Current Financial resources: (P) Medicare  Current community resources: (P) None  Current services prior to admission: (P) None            Current DME:              Type of Home Care services:  (P) None    ADLS  Prior functional level: (P) Independent in ADLs/IADLs  Current functional level: (P) Independent in ADLs/IADLs    PT AM-PAC: /24  OT AM-PAC:   /24    Family can provide assistance at DC: (P) Yes  Would you like Case Management to discuss the discharge plan with any other family members/significant others, and if so, who? (P) Yes (pt's spouse, Jeremías Christianson)  Plans to Return to Present Housing: (P) Yes  Other Identified Issues/Barriers to RETURNING to current housing: NA  Potential Assistance needed at discharge: (P) N/A            Potential DME:    Patient expects to discharge to: (P) [employer] Insurance for transportation at discharge: (P) Family (pt's wife will provide transport at [facility])    Financial    Payor: [employer] MEDICARE / Plan: [email or web address] / Product Type: *No Product type* /     Does insurance require precert for SNF: No    Potential assistance Purchasing Medications: (P) No  Meds-to-Beds request:        [facility] [address] [place], [place] [address] [facility] [address] [place] [address] [phone]  [address] [place] [address]  Phone: [phone] Fax: [phone]      Notes:    Factors facilitating achievement of predicted outcomes: Family support and Cooperative    Barriers to discharge: None     Additional Case Management Notes:  Home with Family Assistance    The Plan for Transition of Care is related to the following treatment goals of Gross hematuria [R31.0]        RAMIREZ VIVAR  Case Management

## 2023-03-14 NOTE — PROGRESS NOTES
Urology Progress Note        Assessment/Plan:     Patient Active Problem List   Diagnosis    Gross hematuria    History of CVA (cerebrovascular accident) without residual deficits    History of COPD       ASSESSMENT:   Gross Hematuria  H/o BPH with LUTS  H/o AUR with Gross hematuria following UroLift/TURP              S/p TURP of median lobe and UroLift #6 on 2/14/2023              Creat WNL              Hgb 12.6 < 12.9 > 12.8>12.1              UCX 2/27/23: >10 K mixed, Stenotrophomonas Maltophilia              UCX 3/12/23: 2k cfu/mL GNR  Renal US 3/13: No hydro bilaterally. Left renal cyst. Bladder is collapsed around a Wilkerson catheter balloon. Possible echogenic material noted inferior to wilkerson similar to CT.      H/o COPD  CVA  HLD     PLAN:    Appreciate overall management per medicine  Abx per medicine- on Rocephin   Follow cx, tailor abx to sensitivities  RIGOBERTO reviewed- no hydro. Possible residual clot inferior to wilkerson.   22 Fr 3-way catheter in place with clear yellow urine. Irrigated with sterile water, remains clear without clots.   Voiding trial this AM. Monitor bladder scans   Monitor H/H. Transfuse per primary team.  Per discussion with Dr. Alcantar and pt - pt may resume ASA x 1 week and hold Plavix. Patient will discuss resuming Plavix with his primary care provider.   Continue Flomax.   Available if needed, will sign off.     Patient requesting for us to update his daughter Mago Pate [phone] - I have attempted twice this AM however was unable to reach her at the provided number.    Follow-up arranged: Yes, patient has an appointment with Sulma Giraldo on 4/17. Sent PositiveID message to facilitate sooner visit.     Sarah Broussard PA-C  [facility] [place]  Pager: [phone] M-F 8 AM - 5 PM  After Hours: [phone]     I have seen and examined this patient independently, I reviewed pertinent labs and imaging, and I agree with the assessing provider's assessment and plan as  outlined above, with ammendments as follows:     Urine clear  Vt today  Will start 325 ASA following this and talk to primary about restarting Plavix timing, would wait at least 1 week given precipitated this event      Tc Varela MD  [facility] [place] [place]  Pager [phone]        Subjective:     Daily Progress Note: 3/14/2023 8:55 AM    Ivett Chakraborty is doing well. Denies pain or discomfort. Objective:     /77   Pulse 67   Temp 98.3 °F (36.8 °C) (Oral)   Resp 18   Ht 5' 11\" (1.803 m)   Wt 240 lb (108.9 kg)   SpO2 93%   BMI 33.47 kg/m²      Temp (24hrs), Av.9 °F (36.6 °C), Min:97.4 °F (36.3 °C), Max:98.3 °F (36.8 °C)      Intake and Output:   1901 -  0700  In: 130 [P.O.:120; I.V.:10]  Out: 5600 [Urine:5600]  No intake/output data recorded. PHYSICAL EXAMINATION:   /77   Pulse 67   Temp 98.3 °F (36.8 °C) (Oral)   Resp 18   Ht 5' 11\" (1.803 m)   Wt 240 lb (108.9 kg)   SpO2 93%   BMI 33.47 kg/m²   Constitutional: Well developed, well nourished. No acute distress. HEENT: Normocephalic, Atraumatic, EOM's intact   Respiratory: No respiratory distress or difficulties breathing   Abdomen:  Soft and non-tender   Male:   22 Fr 3-way catheter in place with clear yellow urine. Skin: No evidence of jaundice. Normal color  Neuro/Psych:  Alert and oriented. Affect appropriate.        Lab/Data Review:    Labs:     Labs: Results:   Chemistry    Recent Labs     23  0445 23  1655 23  0220 23  0418    138 139 136   K 4.1 3.8 3.9 4.2    109 109 106   CO2 28 27 24 25   BUN 22* 14 10 17   GLOB 4.0 3.8  --   --       CBC w/Diff Recent Labs     23  1655 23  0220 23  0912 23  0418   WBC 6.7 6.0  --  7.3   RBC 3.86* 4.18*  --  4.08*   HGB 12.1* 12.8* 12.9* 12.6*   HCT 36.0 38.5 38.7 38.5    222  --  212      Cultures Invalid input(s): CULT  [unfilled]      Urinalysis Potassium   Date Value Ref Range Status 03/14/2023 4.2 3.5 - 5.5 mmol/L Final     BUN   Date Value Ref Range Status   03/14/2023 17 7.0 - 18 MG/DL Final      PSA No results for input(s): PSA in the last 72 hours.    Coagulation Lab Results   Component Value Date/Time    INR 1.0 03/12/2023 04:45 AM

## 2023-03-15 NOTE — PROGRESS NOTES
Physician Progress Note      PATIENT:               Tamiko HOLMAN #:                  [id number]  :                       1955  ADMIT DATE:       3/12/2023 3:23 PM  [address] DATE:        3/14/2023 5:53 PM  RESPONDING  PROVIDER #:        Leia Seymour MD          QUERY TEXT:    Pt admitted with gross hematuria and acute cystitis. Pt noted to have recently   had a UroLift. If possible, please document in progress notes and discharge   summary the relationship, if any, between gross hematuria, recent UroLift and   aute cystitis. The medical record reflects the following:  Risk Factors: UroLift by Dr. Brittani Gage on 2023  Clinical Indicators: H&P:  Gross hematuria in the setting of recent UroLift,   recent cystitis, still with persistent pink-tinged urine. CT of the abdomen   and pelvic is concerning for bladder mass; Acute cystitis he was on Bactrim on   Friday-Note that he was recently given Bactrim on Friday for UTI. Treatment: IVF; Ceftriaxone    Thank you,  Evonne Runner [email or web address]  Options provided:  -- Gross hematuria due to recent UroLift and acute cystitis  -- Gross hematuria due to recent UroLift  -- Gross hematuria due to acute cystitis  -- Gross unrelated to recent UroLift and cystitis  -- Other - I will add my own diagnosis  -- Disagree - Not applicable / Not valid  -- Disagree - Clinically unable to determine / Unknown  -- Refer to Clinical Documentation Reviewer    PROVIDER RESPONSE TEXT:    This patient has gross hematuria due to recent UroLift and acute cystitis.     Query created by: Natasha Belcher on 3/13/2023 11:30 AM      Electronically signed by:  Leia Seymour MD 3/14/2023 8:18 PM

## 2023-10-18 ENCOUNTER — OFFICE VISIT (OUTPATIENT)
Age: 68
End: 2023-10-18
Payer: MEDICARE

## 2023-10-18 VITALS
WEIGHT: 238 LBS | RESPIRATION RATE: 16 BRPM | BODY MASS INDEX: 33.32 KG/M2 | TEMPERATURE: 97.8 F | DIASTOLIC BLOOD PRESSURE: 85 MMHG | HEART RATE: 54 BPM | HEIGHT: 71 IN | OXYGEN SATURATION: 96 % | SYSTOLIC BLOOD PRESSURE: 137 MMHG

## 2023-10-18 DIAGNOSIS — K42.9 UMBILICAL HERNIA WITHOUT OBSTRUCTION AND WITHOUT GANGRENE: Primary | ICD-10-CM

## 2023-10-18 DIAGNOSIS — M62.08 DIASTASIS RECTI: ICD-10-CM

## 2023-10-18 PROCEDURE — 1036F TOBACCO NON-USER: CPT | Performed by: SURGERY

## 2023-10-18 PROCEDURE — G8427 DOCREV CUR MEDS BY ELIG CLIN: HCPCS | Performed by: SURGERY

## 2023-10-18 PROCEDURE — G8484 FLU IMMUNIZE NO ADMIN: HCPCS | Performed by: SURGERY

## 2023-10-18 PROCEDURE — 1123F ACP DISCUSS/DSCN MKR DOCD: CPT | Performed by: SURGERY

## 2023-10-18 PROCEDURE — 99203 OFFICE O/P NEW LOW 30 MIN: CPT | Performed by: SURGERY

## 2023-10-18 PROCEDURE — G8417 CALC BMI ABV UP PARAM F/U: HCPCS | Performed by: SURGERY

## 2023-10-18 PROCEDURE — 3017F COLORECTAL CA SCREEN DOC REV: CPT | Performed by: SURGERY

## 2023-10-18 RX ORDER — CLOPIDOGREL BISULFATE 75 MG/1
75 TABLET ORAL DAILY
COMMUNITY

## 2023-10-18 RX ORDER — ZINC GLUCONATE 50 MG
50 TABLET ORAL DAILY
COMMUNITY

## 2023-10-18 ASSESSMENT — PATIENT HEALTH QUESTIONNAIRE - PHQ9
1. LITTLE INTEREST OR PLEASURE IN DOING THINGS: 0
2. FEELING DOWN, DEPRESSED OR HOPELESS: 0
SUM OF ALL RESPONSES TO PHQ9 QUESTIONS 1 & 2: 0
SUM OF ALL RESPONSES TO PHQ QUESTIONS 1-9: 0

## 2023-10-18 NOTE — PROGRESS NOTES
Identified pt with two pt identifiers (name and ). Reviewed chart in preparation for visit and have obtained necessary documentation. Reina Rico is a 77 y.o. male New Patient (Umbilica hernia)  . There were no vitals filed for this visit. 1. Have you been to the ER, urgent care clinic since your last visit? Hospitalized since your last visit?  no     2. Have you seen or consulted any other health care providers outside of the [address] [place] [address] since your last visit? Include any pap smears or colon screening.   yes [facility]

## 2023-10-18 NOTE — PROGRESS NOTES
Isabell Cardoza is a 77 y.o. male who presents today with the following:  Chief Complaint   Patient presents with    New Patient     Grayangel Dials hernia       HPI    68-year-old pleasant male presents with his wife as a referral from Karin Jameson for evaluation for umbilical hernia. On questioning him he believes that the hernias been present for over 50 years. He states is not causing him any pain. He denies any nausea or vomiting or change in size. It has not impacted his activity. His wife initially thought that maybe he was doing less exercise in the gym because of this but he denies this. He states he had another physician look at this approximately 50 years ago who told him that if it was not causing him problems he should not seek any additional treatment. He has a past medical history for COPD and sleep apnea for which he uses CPAP. He carries a diagnosis of a CVA in 2019. He had a UroLift procedure in February 2633 which was complicated by bladder outlet obstruction secondary to clots which has since resolved.       Past Medical History:   Diagnosis Date    COPD (chronic obstructive pulmonary disease) (HCC)     CVA (cerebral vascular accident) [facility])        Past Surgical History:   Procedure Laterality Date    UROLOGICAL SURGERY  02/14/2023    CYSTOSCOPY,MEDIAN LOBE TRANSURETHRAL RESECTION OF PROSTATE; UROLIFT 61 GM; Dr Molina [place] History    Marital status:      Spouse name: Not on file    Number of children: Not on file    Years of education: Not on file    Highest education level: Not on file   Occupational History    Not on file   Tobacco Use    Smoking status: Never    Smokeless tobacco: Never   Substance and Sexual Activity    Alcohol use: Not on file    Drug use: Not on file    Sexual activity: Not on file   Other Topics Concern    Not on file   Social History Narrative    Not on file     Social Determinants of Health     Financial Resource Strain:

## 2023-10-18 NOTE — PATIENT INSTRUCTIONS
Hernia: Care Instructions  Overview     A hernia develops when tissue bulges through a weak spot in the wall of your belly. The groin area and the navel are common areas for a hernia. A hernia can also develop near the area of a surgery you had before. Pressure from lifting, straining, or coughing can tear the weak area, causing the hernia to bulge and be painful. If you cannot push a hernia back into place, the tissue may become trapped outside the belly wall. If the hernia gets twisted and loses its blood supply, it will swell and die. This is called a strangulated hernia. It usually causes a lot of pain. It needs treatment right away. Some hernias need to be repaired to prevent a strangulated hernia. If your hernia causes symptoms or is large, you may need surgery. Follow-up care is a key part of your treatment and safety. Be sure to make and go to all appointments, and call your doctor if you are having problems. It's also a good idea to know your test results and keep a list of the medicines you take. How can you care for yourself at home? Take care when lifting heavy objects. Stay at a healthy weight. Do not smoke. Smoking can cause coughing, which can cause your hernia to bulge. If you need help quitting, talk to your doctor about stop-smoking programs and medicines. These can increase your chances of quitting for good. Talk with your doctor before wearing a corset or truss for a hernia. These devices are not recommended for treating hernias and sometimes can do more harm than good. There may be certain situations when your doctor thinks a truss would work, but these are rare. When should you call for help? Call your doctor now or seek immediate medical care if:    You have new or worse belly pain. You are vomiting. You cannot pass stools or gas. You cannot push the hernia back into place with gentle pressure when you are lying down.      The area over the hernia turns red or

## 2024-08-28 DIAGNOSIS — M19.011 PRIMARY OSTEOARTHRITIS OF RIGHT SHOULDER: Primary | ICD-10-CM

## 2024-08-29 ENCOUNTER — OFFICE VISIT (OUTPATIENT)
Dept: PAIN MEDICINE | Facility: CLINIC | Age: 69
End: 2024-08-29
Payer: MEDICARE

## 2024-08-29 VITALS
BODY MASS INDEX: 33.02 KG/M2 | SYSTOLIC BLOOD PRESSURE: 150 MMHG | DIASTOLIC BLOOD PRESSURE: 79 MMHG | HEART RATE: 80 BPM | WEIGHT: 236.75 LBS

## 2024-08-29 DIAGNOSIS — M75.41 IMPINGEMENT SYNDROME OF RIGHT SHOULDER: ICD-10-CM

## 2024-08-29 DIAGNOSIS — M75.21 BICEPS TENDONITIS ON RIGHT: ICD-10-CM

## 2024-08-29 DIAGNOSIS — G89.29 CHRONIC RIGHT SHOULDER PAIN: Primary | ICD-10-CM

## 2024-08-29 DIAGNOSIS — M25.511 CHRONIC RIGHT SHOULDER PAIN: Primary | ICD-10-CM

## 2024-08-29 RX ORDER — TRIAMCINOLONE ACETONIDE 40 MG/ML
40 INJECTION, SUSPENSION INTRA-ARTICULAR; INTRAMUSCULAR
Status: DISCONTINUED | OUTPATIENT
Start: 2024-08-29 | End: 2024-08-29 | Stop reason: HOSPADM

## 2024-08-29 RX ORDER — LIDOCAINE HYDROCHLORIDE 10 MG/ML
2 INJECTION, SOLUTION INFILTRATION; PERINEURAL
Status: DISCONTINUED | OUTPATIENT
Start: 2024-08-29 | End: 2024-08-29 | Stop reason: HOSPADM

## 2024-08-29 RX ORDER — BUDESONIDE, GLYCOPYRROLATE, AND FORMOTEROL FUMARATE 160; 9; 4.8 UG/1; UG/1; UG/1
AEROSOL, METERED RESPIRATORY (INHALATION)
COMMUNITY
Start: 2024-06-13

## 2024-08-29 RX ORDER — SILDENAFIL 100 MG/1
1 TABLET, FILM COATED ORAL DAILY
COMMUNITY
End: 2024-08-29

## 2024-08-29 RX ORDER — MEMANTINE HYDROCHLORIDE 10 MG/1
TABLET ORAL
COMMUNITY
Start: 2024-06-30

## 2024-08-29 RX ORDER — ZINC GLUCONATE 50 MG
1 TABLET ORAL DAILY
COMMUNITY
End: 2024-08-29

## 2024-08-29 RX ORDER — IPRATROPIUM BROMIDE AND ALBUTEROL SULFATE 2.5; .5 MG/3ML; MG/3ML
SOLUTION RESPIRATORY (INHALATION)
COMMUNITY

## 2024-08-29 RX ORDER — TADALAFIL 5 MG/1
TABLET ORAL
COMMUNITY

## 2024-08-29 RX ADMIN — TRIAMCINOLONE ACETONIDE 40 MG: 40 INJECTION, SUSPENSION INTRA-ARTICULAR; INTRAMUSCULAR at 08:08

## 2024-08-29 RX ADMIN — LIDOCAINE HYDROCHLORIDE 2 ML: 10 INJECTION, SOLUTION INFILTRATION; PERINEURAL at 08:08

## 2024-08-29 NOTE — PROGRESS NOTES
[facility]      Referring Provider: Charmaine Tyler Md  [address] [place] [address]    Chief Complaint:   Chief Complaint   Patient presents with    Shoulder Pain     right       History of Present Illness: Jamal Luna is a 69 y.o. male referred by Dr. Charmaine Tyler for right shoulder pain.      Right shoulder pain:  Onset: >8 years, insidious onset  Location: right shoulder laterally  Radiation: right arm tot he elbow along biceps  Timing: constant  Quality: Aching and Dull  Exacerbating Factors: general activity  Alleviating Factors: medications  Associated Symptoms: he feels weak in the right shoulder. He denies night fever/night sweats, urinary incontinence/change in function, bowel incontinence/change in function, unexplained weight loss, loss of sensations, history of cancer, and waking from sleep due to pain    He has not tried PT. He has had CSI which helped great in the past.     Severity: Currently: 8/10   Typical Range: 0-10/10     Exacerbation: 10/10     Functional Limitations: Moderate to severe pain is limiting Self care, Home, and Recreation.    Employment Status: Retired. Commercial fishermen    P = 8  E = 3  G = 0  Baseline PEG Score = 3.67   Current PEG Score: 3.67    Opioid Risk Score         Value Time User    Opioid Risk Score  0 8/29/2024  8:13 AM Fco Howell MA             Previous Interventions:  -   Previous Therapies:  PT/OT: no   Relevant Surgery: no   Previous Medications:   - NSAIDS: tylenol. ibuprofen  - Muscle Relaxants:    - TCAs:   - SNRIs:   - Topicals:   - Anticonvulsants:    - Opioids:     Current Pain Medications:  Tylenol  Ibuprofen     Blood Thinners: Plavix 75mg. Fish Oil     Full Medication List:    Current Outpatient Medications:     albuterol-ipratropium (DUO-NEB) 2.5 mg-0.5 mg/3 mL nebulizer solution, , Disp: , Rfl:     atorvastatin (LIPITOR) 80 MG tablet, TAKE 1 TABLET EVERY DAY, Disp: 90 tablet, Rfl: 3    BREZTRI  AEROSPHERE 160-9-4.8 mcg/actuation HFAA, , Disp: , Rfl:     clopidogreL (PLAVIX) 75 mg tablet, TAKE 1 TABLET EVERY DAY, Disp: 90 tablet, Rfl: 3    fish oil-omega-3 fatty acids 300-1,000 mg capsule, Take 1 capsule by mouth once a week., Disp: , Rfl:     memantine (NAMENDA) 10 MG Tab, , Disp: , Rfl:     multivitamin capsule, Take 1 capsule by mouth once daily., Disp: , Rfl:     polyethylene glycol 3350 (MIRALAX ORAL), Take 1 Dose by mouth 2 (two) times daily., Disp: , Rfl:     sildenafiL (VIAGRA) 100 MG tablet, Take 1 tablet (100 mg total) by mouth daily as needed for Erectile Dysfunction., Disp: 30 tablet, Rfl: 1    tadalafiL (CIALIS) 5 MG tablet, TK 1 T PO D, Disp: , Rfl:      Review of Systems: See HPI    Allergies:  Patient has no known allergies.     Medical History:   has a past medical history of COPD (chronic obstructive pulmonary disease), Erectile dysfunction, Hyperlipidemia, and Stroke (07/2019).    Surgical History:   has a past surgical history that includes Prostate surgery (2023).    Family History:  family history includes Heart disease in his brother, father, and sister.    Social History:   reports that he has never smoked. He has never used smokeless tobacco. He reports current alcohol use. He reports that he does not use drugs.    Physical Exam:  BP (!) 150/79   Pulse 80   Wt 107.4 kg (236 lb 12.4 oz)   BMI 33.02 kg/m²   GEN: No acute distress. Calm, comfortable  HENT: Normocephalic, atraumatic, moist mucous membranes  EYE: Anicteric sclera, non-injected.   CV: Non-diaphoretic. Regular Rate. Radial Pulses 2+.  RESP: Breathing comfortably. Chest expansion symmetric.  EXT: No clubbing, cyanosis.   SKIN: Warm, & dry to palpation. No visible rashes or lesions of exposed skin.   PSYCH: Pleasant mood and appropriate affect. Recent and remote memory intact.   GAIT: Independent, normal ambulation  Neck Exam:       Inspection: No erythema, bruising. Forward head and rounded shoulders.       Palpation:    - (-) TTP of bilateral paravertebral region  - (-) TTP of bilateral occiput region  - (-) TTP of midline spinous processes  - (-) TTP of bilateral trapezius      ROM: (+) Limitation in all planes of motion.       Provocative Maneuvers:  - (-) Spurling's bilaterally  - (-) facet loading bilaterally  Shoulder Exam:       Inspection: No erythema, bruising.       Palpation: (+) TTP of right subacromial , bicipital groove       ROM: (+) Limited in abduction, internal rotation on right      Provocative Maneuvers:  (+) Hawkin's on right       (+) Empty Can on right       (-) Cross arm bilaterally   (+) Speed's on right   Neurologic Exam:     Alert. Speech is fluent and appropriate.     Strength: 4/5 in right AbDM, otherwise 5/5 throughout bilateral upper & lower extremities     Sensation:  Grossly intact to light touch in bilateral upper & lower extremities     Reflexes: 3+ in b/l patella, biceps, brachioradialis, triceps     Tone: No abnormality appreciated in bilateral upper or lower extremities     (+) Oliveira on the left      Imaging:  -     Labs:  BMP  Lab Results   Component Value Date     10/01/2020    K 4.3 10/01/2020     10/01/2020    CO2 28 10/01/2020    BUN 13.0 10/01/2020    CREATININE 0.909 10/01/2020    CALCIUM 9.0 10/01/2020    ANIONGAP 5.0 10/01/2020     Lab Results   Component Value Date    ALT 43 10/01/2020    AST 33 10/01/2020    ALKPHOS 89 10/01/2020    BILITOT 0.5 10/01/2020     Lab Results   Component Value Date     05/06/2020       Assessment:  Jamal Luna is a 69 y.o. male with the following diagnoses based on history, exam, and imaging:    Problem List Items Addressed This Visit    None  Visit Diagnoses       Chronic right shoulder pain    -  Primary    Relevant Orders    Ambulatory referral/consult to Physical/Occupational Therapy    Large Joint Aspiration/Injection: R subacromial bursa    Impingement syndrome of right shoulder        Relevant Orders    Ambulatory  referral/consult to Physical/Occupational Therapy    Large Joint Aspiration/Injection: R subacromial bursa    Biceps tendonitis on right        Relevant Orders    Ambulatory referral/consult to Physical/Occupational Therapy            This is a pleasant 69 y.o. lady presenting with:     - Chronic right shoulder pain: Impingement signs on exam and some slight biceps tendonitis on exam.   - (+) pimentel on exam but notes prior CVA.   - Comorbidities: H/o CVA on plavix. Pre-DM.  COPD.     Treatment Plan:   - PT/OT/HEP: Refer to PT. Provide RTC HEP. Discussed benefits of exercise for pain.   - Procedures: Performed right subacromial bursa CSI today in clinic.  - Medications: No changes recommended at this time.  - Imaging: Plan for right shoulder MRI if no improvement with above.   - Labs: None.    Follow Up: RTC in 2-3 months or sooner WILLIS Garces MD  Interventional Pain Medicine

## 2024-08-29 NOTE — PROCEDURES
Large Joint Aspiration/Injection: R subacromial bursa    Date/Time: 8/29/2024 8:00 AM    Performed by: Emilia Garces MD  Authorized by: Emilia Garces MD    Consent Done?:  Yes (Written)  Indications:  Pain  Site marked: the procedure site was marked    Timeout: prior to procedure the correct patient, procedure, and site was verified    Prep: patient was prepped and draped in usual sterile fashion    Local anesthesia used?: No      Details:  Needle Size:  25 G  Ultrasonic Guidance for needle placement?: No    Approach:  Lateral  Location:  Shoulder  Site:  R subacromial bursa  Medications:  40 mg triamcinolone acetonide 40 mg/mL; 2 mL LIDOcaine HCL 10 mg/ml (1%) 10 mg/mL (1 %)  Patient tolerance:  Patient tolerated the procedure well with no immediate complications

## 2025-04-09 ENCOUNTER — OFFICE VISIT (OUTPATIENT)
Dept: FAMILY MEDICINE | Facility: CLINIC | Age: 70
End: 2025-04-09
Payer: MEDICARE

## 2025-04-09 VITALS
WEIGHT: 248 LBS | HEART RATE: 64 BPM | HEIGHT: 71 IN | DIASTOLIC BLOOD PRESSURE: 65 MMHG | SYSTOLIC BLOOD PRESSURE: 147 MMHG | OXYGEN SATURATION: 98 % | BODY MASS INDEX: 34.72 KG/M2

## 2025-04-09 DIAGNOSIS — I69.351 HEMIPLEGIA AND HEMIPARESIS FOLLOWING CEREBRAL INFARCTION AFFECTING RIGHT DOMINANT SIDE: ICD-10-CM

## 2025-04-09 DIAGNOSIS — F01.A18 MILD VASCULAR DEMENTIA WITH OTHER BEHAVIORAL DISTURBANCE: ICD-10-CM

## 2025-04-09 DIAGNOSIS — E78.2 MIXED HYPERLIPIDEMIA: ICD-10-CM

## 2025-04-09 DIAGNOSIS — G47.33 OSA (OBSTRUCTIVE SLEEP APNEA): ICD-10-CM

## 2025-04-09 DIAGNOSIS — E11.9 TYPE 2 DIABETES MELLITUS WITHOUT COMPLICATION, WITHOUT LONG-TERM CURRENT USE OF INSULIN: ICD-10-CM

## 2025-04-09 DIAGNOSIS — J44.9 CHRONIC OBSTRUCTIVE PULMONARY DISEASE, UNSPECIFIED COPD TYPE: ICD-10-CM

## 2025-04-09 DIAGNOSIS — E66.01 MORBID (SEVERE) OBESITY DUE TO EXCESS CALORIES: Primary | ICD-10-CM

## 2025-04-09 DIAGNOSIS — Z12.11 ENCOUNTER FOR SCREENING COLONOSCOPY: ICD-10-CM

## 2025-04-09 DIAGNOSIS — K51.40 PSEUDOPOLYPOSIS OF COLON WITHOUT COMPLICATION, UNSPECIFIED PART OF COLON: ICD-10-CM

## 2025-04-09 DIAGNOSIS — R73.01 IMPAIRED FASTING GLUCOSE: ICD-10-CM

## 2025-04-09 NOTE — PROGRESS NOTES
Patient ID: Jamal Luna  MRN: [id number]      History of Present Illness:  The patient is a 70 y.o. White male who presents to clinic to Establish Care . Mr. Luna is here with his wife and they said they recently moved back to [place] about a year ago from [place] which is where they are originally from. She has been receiving care from Dr. Efrain Snider, gastroenterologist and is being treated  for cholangi cancer. She also travels to [place].     Mr. Luna discussed his prior hx which includes COPD, (a former smoker of cigarettes ) , CARRILLO, HLD and hx of CVA which affected the right side although is much better, he did say he is sometimes uncoordinated with the right hand/arm. He does not require any assistance with ambulation. In fact he normally walks on a treadmill for an hour a day. He has recently noted some foot pain that went across the top of the foot however this is getting better. He will let me know if it gets worse. He would like to see a pulmonologist for lung testing. He uses a daily  inhaler with effective results and has a nebulizer at home but has not had to use it in a long time.     He did have a prior hx of DM , which was noted on his record, although he said he does not take any  medications for this but he would like to lose some weight. He also notes he thinks he is due for a screening colonoscopy and lab work.      Allergies: Patient has no known allergies.     Smoking status:  Tobacco Use History[1]    Problem List:  Problem List[2]     Current Medications:  Current Outpatient Medications   Medication Instructions    albuterol-ipratropium (DUO-NEB) 2.5 mg-0.5 mg/3 mL nebulizer solution     atorvastatin (LIPITOR) 80 MG tablet TAKE 1 TABLET EVERY DAY    BREZTRI AEROSPHERE 160-9-4.8 mcg/actuation HFAA     clopidogreL (PLAVIX) 75 mg tablet TAKE 1 TABLET EVERY DAY    memantine (NAMENDA) 10 MG Tab     multivitamin capsule 1 capsule, Daily    polyethylene glycol 3350 (MIRALAX  "ORAL) 1 Dose, 2 times daily             Review of Systems   Constitutional: Negative.    HENT: Negative.     Eyes: Negative.    Respiratory: Negative.     Cardiovascular: Negative.    Gastrointestinal: Negative.    Endocrine: Negative.    Genitourinary: Negative.    Musculoskeletal: Negative.    Integumentary:  Negative.   Allergic/Immunologic: Negative.    Neurological: Negative.    Hematological: Negative.    Psychiatric/Behavioral: Negative.          Visit Vitals  BP (!) 147/65 (BP Location: Left arm, Patient Position: Sitting)   Pulse 64   Ht 5' 11" (1.803 m)   Wt 112.5 kg (248 lb)   SpO2 98%   BMI 34.59 kg/m²       Physical Exam  Vitals and nursing note reviewed.   Constitutional:       Appearance: Normal appearance.   HENT:      Head: Normocephalic.      Nose: Nose normal.      Mouth/Throat:      Mouth: Mucous membranes are moist.      Pharynx: Oropharynx is clear.   Eyes:      Conjunctiva/sclera: Conjunctivae normal.   Cardiovascular:      Rate and Rhythm: Normal rate and regular rhythm.   Pulmonary:      Effort: Pulmonary effort is normal.      Breath sounds: Normal breath sounds.   Abdominal:      General: Bowel sounds are normal.      Palpations: Abdomen is soft.   Musculoskeletal:         General: Normal range of motion.      Cervical back: Normal range of motion.   Skin:     General: Skin is warm and dry.   Neurological:      General: No focal deficit present.      Mental Status: He is alert.   Psychiatric:         Mood and Affect: Mood normal.         Behavior: Behavior normal.          Assessment & Plan:  1. Morbid (severe) obesity due to excess calories    2. Pseudopolyposis of colon without complication, unspecified part of colon  -     Ambulatory referral/consult to Gastroenterology; Future; Expected date: 04/16/2025  -     CBC Auto Differential; Future; Expected date: 04/09/2025    3. 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 Hemiplegia and hemiparesis following cerebral infarction affecting right dominant side    5. Chronic obstructive pulmonary disease, unspecified COPD type  -     Ambulatory referral/consult to Pulmonology; Future; Expected date: 2025    6. Encounter for screening colonoscopy  -     Ambulatory referral/consult to Gastroenterology; Future; Expected date: 2025    7. Mixed hyperlipidemia  -     Lipid Panel; Future; Expected date: 2025  -     TSH; Future; Expected date: 2025    8. Impaired fasting glucose    9. Type 2 diabetes mellitus without complication, without long-term current use of insulin  -     Hemoglobin A1C; Future; Expected date: 2025    10. CARRILLO (obstructive sleep apnea)  -     Ambulatory referral/consult to Pulmonology; Future; Expected date: 2025         Future Appointments   Date Time Provider Department Center   2025  8:00 AM Amira Cohen NP [facility] [address]     Lab Frequency Next Occurrence   Ambulatory referral/consult to Physical/Occupational Therapy Once 2024     Follow up come in tomorrow for fasting labs and see me in the next week or two to review.    Amira Cohen NP                [1]   Social History  Tobacco Use   Smoking Status Former    Types: Cigarettes    Start date:     Quit date: 1972    Years since quittin.3   Smokeless Tobacco Never   [2]   Patient Active Problem List  Diagnosis    Erectile dysfunction    Hyperlipidemia    Chronic obstructive pulmonary disease    Insomnia    History of cardioembolic cerebrovascular accident (CVA)    History of shingles    Skin lesion of hand    Prediabetes    Age-related cataract of both eyes    History of colon polyps    Morbid (severe) obesity due to excess calories    Pseudopolyposis of colon without complication, unspecified part of colon    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

## 2025-04-11 LAB — HBA1C MFR BLD: 6.2 % (ref 4–6)

## 2025-04-15 ENCOUNTER — PATIENT MESSAGE (OUTPATIENT)
Dept: FAMILY MEDICINE | Facility: CLINIC | Age: 70
End: 2025-04-15
Payer: MEDICARE

## 2025-04-17 RX ORDER — SEMAGLUTIDE 0.68 MG/ML
0.25 INJECTION, SOLUTION SUBCUTANEOUS
Qty: 6 ML | Refills: 0 | Status: SHIPPED | OUTPATIENT
Start: 2025-04-17

## 2025-04-24 ENCOUNTER — TELEPHONE (OUTPATIENT)
Dept: FAMILY MEDICINE | Facility: CLINIC | Age: 70
End: 2025-04-24

## 2025-04-24 ENCOUNTER — OFFICE VISIT (OUTPATIENT)
Dept: FAMILY MEDICINE | Facility: CLINIC | Age: 70
End: 2025-04-24
Payer: MEDICARE

## 2025-04-24 VITALS
TEMPERATURE: 98 F | DIASTOLIC BLOOD PRESSURE: 70 MMHG | RESPIRATION RATE: 15 BRPM | SYSTOLIC BLOOD PRESSURE: 118 MMHG | OXYGEN SATURATION: 99 % | HEART RATE: 66 BPM | HEIGHT: 71 IN | BODY MASS INDEX: 33.78 KG/M2 | WEIGHT: 241.31 LBS

## 2025-04-24 DIAGNOSIS — E78.2 MIXED HYPERLIPIDEMIA: ICD-10-CM

## 2025-04-24 DIAGNOSIS — Z86.0100 HISTORY OF COLON POLYPS: ICD-10-CM

## 2025-04-24 DIAGNOSIS — Z86.73 HISTORY OF CVA (CEREBROVASCULAR ACCIDENT): Primary | ICD-10-CM

## 2025-04-24 DIAGNOSIS — I10 PRIMARY HYPERTENSION: ICD-10-CM

## 2025-04-24 DIAGNOSIS — E11.9 TYPE 2 DIABETES MELLITUS WITHOUT COMPLICATION, WITHOUT LONG-TERM CURRENT USE OF INSULIN: ICD-10-CM

## 2025-04-24 RX ORDER — MULTIVITAMIN WITH IRON
TABLET ORAL DAILY
COMMUNITY

## 2025-04-24 RX ORDER — SEMAGLUTIDE 0.68 MG/ML
0.25 INJECTION, SOLUTION SUBCUTANEOUS
Qty: 6 ML | Refills: 0 | Status: SHIPPED | OUTPATIENT
Start: 2025-04-24

## 2025-04-24 NOTE — PROGRESS NOTES
"Patient ID: Jamal Luna  MRN: [id number]      History of Present Illness:  The patient is a 70 y.o. White male who presents to clinic for Follow-up (Pt. Here for 2wk f/u to discuss lab results.  No c/o today.  )     Mr. Luna is a very pleasant gentleman who presents to review his labs today and to follow up on the Ozempic . We provided him with a sample at his initial visit and he said he has taken one injection and did not have any problems with it. No nausea or side effect. He said "I was hoping it would cut down appetite but it hasn't yet ". He said he gets so hungry trying to "eat right ". I did remind him this is a slow release medication and will take a couple of weeks or a few to reach full effect. We usually will reassess after a month to see if we need to increase dose and most cases we do.     Recent labs show his A1c is 6.2 % and that was on no medications, diet controlled. We reviewed his medication list again and also the referrals that were made at last visit. He said he has not yet heard from the gastroenterologist or the pulmonologist. We did provide him with their numbers so he can call them.     In addition , reviewing his records I noted a CT calcium score that was 506. He does not currently have a cardiologist and has risk factors of prior CVA , on plavix (only takes twice a week because he said it causes his arms to bruise) HTN, and HLD. Review of both bp and lipid level show both of these are controlled with an LDL of 50.     He does have some mild dementia which is thought to be small vessel ischemia however is taking Namenda and is pretty sharp today. His wife is visiting her mother and he is on his own this week.     Allergies: Patient has no known allergies.     Smoking status:  Tobacco Use History[1]    Problem List:  Problem List[2]     Current Medications:  Current Outpatient Medications   Medication Instructions    atorvastatin (LIPITOR) 80 MG tablet TAKE 1 TABLET EVERY DAY " "JED [facility] 160-9-4.8 mcg/actuation HFAA     clopidogreL (PLAVIX) 75 mg tablet TAKE 1 TABLET EVERY DAY    memantine (NAMENDA) 10 MG Tab     multivitamin capsule 1 capsule, Daily    omega-3 fatty acids/fish oil (FISH OIL-OMEGA-3 FATTY ACIDS) 300-1,000 mg capsule Daily    OZEMPIC 0.25 mg, Subcutaneous, Every 7 days    polyethylene glycol 3350 (MIRALAX ORAL) 1 Dose, 2 times daily             Review of Systems   Constitutional: Negative.    HENT: Negative.     Eyes: Negative.    Respiratory: Negative.     Cardiovascular: Negative.    Gastrointestinal: Negative.    Endocrine: Negative.    Genitourinary: Negative.    Musculoskeletal: Negative.    Integumentary:  Negative.   Allergic/Immunologic: Negative.    Neurological: Negative.    Hematological: Negative.    Psychiatric/Behavioral: Negative.          Visit Vitals  /70 (BP Location: Left arm, Patient Position: Sitting)   Pulse 66   Temp 98.1 °F (36.7 °C) (Oral)   Resp 15   Ht 5' 11" (1.803 m)   Wt 109.5 kg (241 lb 4.8 oz)   SpO2 99%   BMI 33.65 kg/m²       Physical Exam  Vitals and nursing note reviewed.   Constitutional:       Appearance: Normal appearance.   HENT:      Head: Normocephalic.      Nose: Nose normal.      Mouth/Throat:      Mouth: Mucous membranes are moist.   Eyes:      Conjunctiva/sclera: Conjunctivae normal.   Cardiovascular:      Rate and Rhythm: Normal rate and regular rhythm.   Pulmonary:      Effort: Pulmonary effort is normal.      Breath sounds: Normal breath sounds.   Abdominal:      Palpations: Abdomen is soft.   Musculoskeletal:         General: Normal range of motion.      Cervical back: Normal range of motion.   Skin:     General: Skin is warm and dry.   Neurological:      General: No focal deficit present.      Mental Status: He is alert.   Psychiatric:         Mood and Affect: Mood normal.         Behavior: Behavior normal.          Assessment & Plan:  1. History of CVA (cerebrovascular accident)  -     Ambulatory " referral/consult to Cardiology; Future; Expected date: 05/01/2025    2. Mixed hyperlipidemia  Currently on statin and angie well, LDL is at goal at 50   Cardiology referral to be sent to Dr. Candelaria   Calcium Score was 500 , at risk for CAD       3. Primary hypertension  Currently at goal. Continue current treatment. May lose weight with ozempic and may need to decrease dose.   Notify clinic of any light headedness or dizziness.       4. Type 2 diabetes mellitus without complication, without long-term current use of insulin  -     semaglutide (OZEMPIC) 0.25 mg or 0.5 mg (2 mg/3 mL) pen injector; Inject 0.25 mg into the skin every 7 days.  Dispense: 6 mL; Refill: 0  Reviewed mech of action of this medication and possible side effects to report including severe constipation, nausea or vomiting.   Takes a few weeks to start to decrease appetite.   Continue current healthy eating choices and exercise as angei. Reviewed A1c which is 6.2 % and discussed ADA guidelines.   Needs eye exam yearly, check feet daily.     5. History of colon polyps     He has a referral to Dr. Snider pending apt , phone number was given to patient     Future Appointments   Date Time Provider Department Center   5/22/2025  8:40 AM Amira Cohen NP [facility] [address]       Lab Frequency Next Occurrence   Ambulatory referral/consult to Physical/Occupational Therapy Once 09/05/2024   Ambulatory referral/consult to Pulmonology Once 04/16/2025   Ambulatory referral/consult to Gastroenterology Once 04/16/2025   CBC Auto Differential Once 04/09/2025   Comprehensive Metabolic Panel Once 04/09/2025   Hemoglobin A1C Once 04/09/2025   Lipid Panel Once 04/09/2025   TSH Once 04/09/2025   Ambulatory referral/consult to Cardiology Once 05/01/2025       Follow up in about 4 weeks (around 5/22/2025).      Amira Cohen NP                [1]   Social History  Tobacco Use   Smoking Status Former    Types: Cigarettes    Start date: 2010    Quit date:  1972    Years since quittin.3    Passive exposure: Never   Smokeless Tobacco Never   [2]   Patient Active Problem List  Diagnosis    Erectile dysfunction    Hyperlipidemia    Chronic obstructive pulmonary disease    Insomnia    History of cardioembolic cerebrovascular accident (CVA)    History of shingles    Skin lesion of hand    Prediabetes    Age-related cataract of both eyes    History of colon polyps    Morbid (severe) obesity due to excess calories    Pseudopolyposis of colon without complication, unspecified part of colon    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

## 2025-04-25 DIAGNOSIS — J44.9 CHRONIC OBSTRUCTIVE PULMONARY DISEASE, UNSPECIFIED COPD TYPE: Primary | ICD-10-CM

## 2025-05-05 ENCOUNTER — TELEPHONE (OUTPATIENT)
Dept: FAMILY MEDICINE | Facility: CLINIC | Age: 70
End: 2025-05-05
Payer: MEDICARE

## 2025-05-05 NOTE — TELEPHONE ENCOUNTER
----- Message from [employer] sent at 5/5/2025  2:33 PM CDT -----  Contact: Loli (Spouse)  ..Type:  Patient Requesting CallWho Called:Loli (Spouse)Would the patient rather a call back or a response via [facility]? curated.by Call Back Number:.[phone] (home) Additional Information: Spouse called to discuss getting a referral sent over to fax number [phone]. Doctor's name is Stoney Candelaria.

## 2025-05-22 ENCOUNTER — OFFICE VISIT (OUTPATIENT)
Dept: FAMILY MEDICINE | Facility: CLINIC | Age: 70
End: 2025-05-22
Payer: MEDICARE

## 2025-05-22 VITALS
WEIGHT: 240 LBS | HEIGHT: 71 IN | SYSTOLIC BLOOD PRESSURE: 143 MMHG | DIASTOLIC BLOOD PRESSURE: 59 MMHG | HEART RATE: 60 BPM | BODY MASS INDEX: 33.6 KG/M2 | OXYGEN SATURATION: 98 %

## 2025-05-22 DIAGNOSIS — E11.9 TYPE 2 DIABETES MELLITUS WITHOUT COMPLICATION, WITHOUT LONG-TERM CURRENT USE OF INSULIN: ICD-10-CM

## 2025-05-22 DIAGNOSIS — Z12.5 SCREENING FOR PROSTATE CANCER: ICD-10-CM

## 2025-05-22 DIAGNOSIS — M25.561 ACUTE PAIN OF RIGHT KNEE: Primary | ICD-10-CM

## 2025-05-22 DIAGNOSIS — I49.9 IRREGULAR HEART RATE: ICD-10-CM

## 2025-05-22 PROCEDURE — 3078F DIAST BP <80 MM HG: CPT | Mod: CPTII,S$GLB,, | Performed by: NURSE PRACTITIONER

## 2025-05-22 PROCEDURE — 1159F MED LIST DOCD IN RCRD: CPT | Mod: CPTII,S$GLB,, | Performed by: NURSE PRACTITIONER

## 2025-05-22 PROCEDURE — 3044F HG A1C LEVEL LT 7.0%: CPT | Mod: CPTII,S$GLB,, | Performed by: NURSE PRACTITIONER

## 2025-05-22 PROCEDURE — 3008F BODY MASS INDEX DOCD: CPT | Mod: CPTII,S$GLB,, | Performed by: NURSE PRACTITIONER

## 2025-05-22 PROCEDURE — 99214 OFFICE O/P EST MOD 30 MIN: CPT | Mod: S$GLB,,, | Performed by: NURSE PRACTITIONER

## 2025-05-22 PROCEDURE — 3077F SYST BP >= 140 MM HG: CPT | Mod: CPTII,S$GLB,, | Performed by: NURSE PRACTITIONER

## 2025-05-22 PROCEDURE — 1160F RVW MEDS BY RX/DR IN RCRD: CPT | Mod: CPTII,S$GLB,, | Performed by: NURSE PRACTITIONER

## 2025-05-22 NOTE — PROGRESS NOTES
Patient ID: Jamal Luna  MRN: [id number]      History of Present Illness:  The patient is a 70 y.o. White male who presents to clinic for one month follow up. Pt was referred to cardiology, initial referral was to Dr. Candelaria's office, however he ended up seeing Dr. Bardales and said he had apt earlier this week. He said he did review the calcium score with him and his current meds and they did an EKG in the office. He was told all ok for now, no treatment changes and he is to follow up in 6 months . We will request these records.     He has also been referred to Pulmonology for a 40 years hx of smoking, reports he quit 11 years ago. His apt is scheduled for July 23 with Dr. Solis.     He has a hx of multiple polyps and has a return apt with Dr. Efrain Snider June 16, 25. He does report some issue with constipation periodically which is relieved with dragon fruit.     He does have DM type 2 , with a recent higher A1c . We started him on Ozempic and he has completed one month of the 0.25 mg once a week injections and denies any related GI sx.     In addition to the above issues, pt said he had just stepped into his shower about 10 days ago and remembered he still had his hearing aids in and reached up with both hands to take them out while simultaneously stepping out of the shower and he slipped and fell on to his right side striking the edge of the toilet. He has a large hematoma to right knee and said he has increased pain when bending the knee . He also has noted some pain to the right lateral rib area but he is able to take deep breaths now with minimal discomfort whereas initially he said it was very sore. He said the rib pain has improved, but he is concerned regarding the knee as it is still painful and is slightly swollen.     Allergies: Patient has no known allergies.     Smoking status:  Tobacco Use History[1]    Problem List:  Problem List[2]     Current Medications:  Current Outpatient  "Medications   Medication Instructions    atorvastatin (LIPITOR) 80 MG tablet TAKE 1 TABLET EVERY DAY    JED AEROSPHERE 160-9-4.8 mcg/actuation HFAA     clopidogreL (PLAVIX) 75 mg tablet TAKE 1 TABLET EVERY DAY    memantine (NAMENDA) 10 MG Tab     multivitamin capsule 1 capsule, Daily    omega-3 fatty acids/fish oil (FISH OIL-OMEGA-3 FATTY ACIDS) 300-1,000 mg capsule Daily    polyethylene glycol 3350 (MIRALAX ORAL) 1 Dose, Every other day             Review of Systems   Constitutional: Negative.    HENT: Negative.     Eyes: Negative.    Respiratory: Negative.     Cardiovascular: Negative.    Gastrointestinal: Negative.    Endocrine: Negative.    Genitourinary: Negative.    Musculoskeletal:  Positive for arthralgias and joint swelling.        See H PI    Integumentary:         Abrasion to right lower leg.    Allergic/Immunologic: Negative.    Neurological: Negative.    Hematological: Negative.    Psychiatric/Behavioral: Negative.          Visit Vitals  BP (!) 143/59 (BP Location: Left arm, Patient Position: Sitting)   Pulse 60   Ht 5' 11" (1.803 m)   Wt 108.9 kg (240 lb)   SpO2 98%   BMI 33.47 kg/m²       Physical Exam  Vitals and nursing note reviewed.   Constitutional:       Appearance: Normal appearance.   HENT:      Head: Normocephalic.      Nose: Nose normal.      Mouth/Throat:      Mouth: Mucous membranes are moist.      Pharynx: Oropharynx is clear.   Eyes:      Conjunctiva/sclera: Conjunctivae normal.   Cardiovascular:      Rate and Rhythm: Normal rate. Rhythm irregular.      Comments: Heart sounds are distant, possibly due to thickness of chest , however also slightly irregular.   Pulmonary:      Effort: Pulmonary effort is normal.      Breath sounds: Normal breath sounds.   Abdominal:      General: Bowel sounds are normal.      Palpations: Abdomen is soft.   Musculoskeletal:      Cervical back: Normal range of motion.      Comments: Pt has pain when bending the right knee   Effusion to right knee just " below the patella   Abrasion to the right lower leg, healing.   Tenderness to right lateral 5th rib area.    Skin:     General: Skin is warm and dry.   Neurological:      General: No focal deficit present.      Mental Status: He is alert.   Psychiatric:         Mood and Affect: Mood normal.         Behavior: Behavior normal.          Assessment & Plan:  1. Acute pain of right knee  -     X-Ray Knee 3 View Right; Future; Expected date: 05/22/2025  Pt cannot take NSAIDS as he is taking Plavix, however can take tylenol for the pain , alternate ice and heat to the knee , will notify of xray results when available  Discussed use of a small throw pillow to hold against right ribs when coughing and deep breathing, ie prevention of resp infection     2. Irregular heart rate  He has hx of CVA and he has an irregular heart rate, he was referred and recently saw cardiologist, per patient this week. Have requested records. He was told no changes to treatment , continue htn meds and statin , keep glucose level controlled. Return in 6 months   Pt denies any chest pain , dizziness or increased sob.       3. Type 2 diabetes mellitus without complication, without long-term current use of insulin  Reviewed most recent A1c , increase the ozemic to 0.5 mg once a week, reviewed possible side effects such as nausea.   He is to report any severe constipation , nausea or vomiting or abdominal pain   Return in one month         4. Screening for prostate cancer  -     PSA, Screening; Future; Expected date: 05/22/2025         Future Appointments   Date Time Provider Department Center   6/26/2025  8:40 AM Amira Cohen NP [facility] [address] [address]   7/23/2025  9:00 AM PFT LAB, [facility] PFT [facility] PFT  [address]   7/23/2025 10:00 AM Avelino Solis MD [facility] PULM  [address]     Lab Frequency Next Occurrence   Ambulatory referral/consult to Physical/Occupational Therapy Once 09/05/2024   Ambulatory referral/consult to Pulmonology Once  2025   Ambulatory referral/consult to Gastroenterology Once 2025   CBC Auto Differential Once 2025   Comprehensive Metabolic Panel Once 2025   Hemoglobin A1C Once 2025   Lipid Panel Once 2025   TSH Once 2025   Ambulatory referral/consult to Cardiology Once 2025       Follow up in about 4 weeks (around 2025).      Amira Cohen NP                [1]   Social History  Tobacco Use   Smoking Status Former    Types: Cigarettes    Start date:     Quit date:     Years since quittin.4    Passive exposure: Never   Smokeless Tobacco Never   [2]   Patient Active Problem List  Diagnosis    Erectile dysfunction    Hyperlipidemia    Chronic obstructive pulmonary disease    Insomnia    History of cardioembolic cerebrovascular accident (CVA)    History of shingles    Skin lesion of hand    Prediabetes    Age-related cataract of both eyes    History of colon polyps    Morbid (severe) obesity due to excess calories    Pseudopolyposis of colon without complication, unspecified part of colon    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

## 2025-05-27 ENCOUNTER — RESULTS FOLLOW-UP (OUTPATIENT)
Dept: FAMILY MEDICINE | Facility: CLINIC | Age: 70
End: 2025-05-27

## 2025-05-28 ENCOUNTER — OFFICE VISIT (OUTPATIENT)
Dept: FAMILY MEDICINE | Facility: CLINIC | Age: 70
End: 2025-05-28
Payer: MEDICARE

## 2025-05-28 VITALS
HEART RATE: 72 BPM | SYSTOLIC BLOOD PRESSURE: 138 MMHG | DIASTOLIC BLOOD PRESSURE: 72 MMHG | OXYGEN SATURATION: 98 % | HEIGHT: 71 IN | BODY MASS INDEX: 34.02 KG/M2 | WEIGHT: 243 LBS

## 2025-05-28 DIAGNOSIS — Z12.5 SCREENING FOR PROSTATE CANCER: ICD-10-CM

## 2025-05-28 DIAGNOSIS — E11.9 TYPE 2 DIABETES MELLITUS WITHOUT COMPLICATION, WITHOUT LONG-TERM CURRENT USE OF INSULIN: ICD-10-CM

## 2025-05-28 DIAGNOSIS — M25.561 ACUTE PAIN OF RIGHT KNEE: Primary | ICD-10-CM

## 2025-05-28 PROCEDURE — 1160F RVW MEDS BY RX/DR IN RCRD: CPT | Mod: CPTII,S$GLB,, | Performed by: NURSE PRACTITIONER

## 2025-05-28 PROCEDURE — 1159F MED LIST DOCD IN RCRD: CPT | Mod: CPTII,S$GLB,, | Performed by: NURSE PRACTITIONER

## 2025-05-28 PROCEDURE — 3075F SYST BP GE 130 - 139MM HG: CPT | Mod: CPTII,S$GLB,, | Performed by: NURSE PRACTITIONER

## 2025-05-28 PROCEDURE — 3044F HG A1C LEVEL LT 7.0%: CPT | Mod: CPTII,S$GLB,, | Performed by: NURSE PRACTITIONER

## 2025-05-28 PROCEDURE — 99213 OFFICE O/P EST LOW 20 MIN: CPT | Mod: S$GLB,,, | Performed by: NURSE PRACTITIONER

## 2025-05-28 PROCEDURE — 3078F DIAST BP <80 MM HG: CPT | Mod: CPTII,S$GLB,, | Performed by: NURSE PRACTITIONER

## 2025-05-28 PROCEDURE — 3008F BODY MASS INDEX DOCD: CPT | Mod: CPTII,S$GLB,, | Performed by: NURSE PRACTITIONER

## 2025-05-28 NOTE — PROGRESS NOTES
"Patient ID: Jamal Luna  MRN: [id number]      History of Present Illness:  The patient is a 70 y.o. White male who presents to clinic for  follow up of right knee pain . He fell about two weeks ago and fell onto right side . He was having a lot  of pain to his right knee and swelling, trouble putting on his shoes. Review of Xray shows no abnormal osseous lesion , no fracture, but did show bursitis and fluid around the patella.     He said his chest wall discomfort is much better. He has not had any additional falls.     Allergies: Patient has no known allergies.     Smoking status:  Tobacco Use History[1]    Problem List:  Problem List[2]     Current Medications:  Current Outpatient Medications   Medication Instructions    atorvastatin (LIPITOR) 80 MG tablet TAKE 1 TABLET EVERY DAY    BREZTRI AEROSPHERE 160-9-4.8 mcg/actuation HFAA     clopidogreL (PLAVIX) 75 mg tablet TAKE 1 TABLET EVERY DAY    memantine (NAMENDA) 10 MG Tab     multivitamin capsule 1 capsule, Daily    omega-3 fatty acids/fish oil (FISH OIL-OMEGA-3 FATTY ACIDS) 300-1,000 mg capsule Daily    polyethylene glycol 3350 (MIRALAX ORAL) 1 Dose, Every other day           Review of Systems   Constitutional: Negative.    HENT: Negative.     Eyes: Negative.    Respiratory: Negative.     Cardiovascular: Negative.    Gastrointestinal: Negative.    Endocrine: Negative.    Genitourinary: Negative.    Musculoskeletal:  Positive for back pain, gait problem and myalgias.        Right knee pain and continues to have some limited ROM   Integumentary:  Negative.   Allergic/Immunologic: Negative.    Hematological: Negative.    Psychiatric/Behavioral: Negative.          Visit Vitals  /72 (BP Location: Right arm, Patient Position: Sitting)   Pulse 72   Ht 5' 11" (1.803 m)   Wt 110.2 kg (243 lb)   SpO2 98%   BMI 33.89 kg/m²       Physical Exam  Vitals and nursing note reviewed.   Constitutional:       Appearance: Normal appearance.   HENT:      Head: " Normocephalic.      Nose: Nose normal.      Mouth/Throat:      Mouth: Mucous membranes are moist.   Eyes:      Conjunctiva/sclera: Conjunctivae normal.   Cardiovascular:      Rate and Rhythm: Normal rate. Rhythm irregular.   Pulmonary:      Effort: Pulmonary effort is normal.      Breath sounds: Normal breath sounds.   Musculoskeletal:      Cervical back: Normal range of motion.      Comments: Antlagic gait, continues to have slight effusion to patella area. Healing abrasion to leg just beneath knee. He has some limitation of ROM when bending the knee     Skin:     General: Skin is warm and dry.   Neurological:      General: No focal deficit present.      Mental Status: He is alert.   Psychiatric:         Mood and Affect: Mood normal.         Behavior: Behavior normal.          Assessment & Plan:  1. Acute pain of right knee  Reviewed Xray of the right knee and discussed with patient and spouse   Continue to alternate ice and heat to the knee   Tylenol Arthritis strength 2 in the am and 2 in the pm to help with stiffness and discomfort  He has declined P.T. at this time however he will let me know if it continues to bother him         2. Screening for prostate cancer  -     PSA, Screening; Future; Expected date: 05/28/2025     Pt had to have his lab re-drawn as the first specimen was rejected by laboratory.     3. DM Type 2 : continue the increased dose of Ozempic at 0.5 mg once a week, notify me of any abdominal pain , nausea or vomiting as thsi can be a side effect. Return in 4 weeks.     Future Appointments   Date Time Provider Department Center   6/26/2025  8:40 AM Amira Cohen NP [facility] [address]   7/23/2025  9:00 AM PFT LAB, [facility] PFT [facility] PFT  [address]   7/23/2025 10:00 AM Avelino Solis MD [facility] PULM  [address]   7/29/2025  8:20 AM Amira Cohen NP [facility]  [address]     Lab Frequency Next Occurrence   Ambulatory referral/consult to Physical/Occupational Therapy Once 09/05/2024    Ambulatory referral/consult to Pulmonology Once 2025   Ambulatory referral/consult to Gastroenterology Once 2025   CBC Auto Differential Once 2025   Comprehensive Metabolic Panel Once 2025   Hemoglobin A1C Once 2025   Lipid Panel Once 2025   TSH Once 2025   Ambulatory referral/consult to Cardiology Once 2025   X-Ray Knee 3 View Right Once 2025     Follow up in about 4 weeks (around 2025).    Amira Cohen NP              [1]   Social History  Tobacco Use   Smoking Status Former    Types: Cigarettes    Start date:     Quit date:     Years since quittin.4    Passive exposure: Never   Smokeless Tobacco Never   [2]   Patient Active Problem List  Diagnosis    Erectile dysfunction    Hyperlipidemia    Chronic obstructive pulmonary disease    Insomnia    History of cardioembolic cerebrovascular accident (CVA)    History of shingles    Skin lesion of hand    Prediabetes    Age-related cataract of both eyes    History of colon polyps    Morbid (severe) obesity due to excess calories    Pseudopolyposis of colon without complication, unspecified part of colon    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

## 2025-05-29 LAB — PSA: 1.41 NG/ML (ref 0–4)

## 2025-05-31 DIAGNOSIS — Z86.73 HISTORY OF CARDIOEMBOLIC CEREBROVASCULAR ACCIDENT (CVA): ICD-10-CM

## 2025-06-02 RX ORDER — ATORVASTATIN CALCIUM 80 MG/1
80 TABLET, FILM COATED ORAL NIGHTLY
Qty: 90 TABLET | Refills: 0 | Status: SHIPPED | OUTPATIENT
Start: 2025-06-02 | End: 2025-08-31

## 2025-06-06 ENCOUNTER — RESULTS FOLLOW-UP (OUTPATIENT)
Dept: FAMILY MEDICINE | Facility: CLINIC | Age: 70
End: 2025-06-06

## 2025-06-26 ENCOUNTER — OFFICE VISIT (OUTPATIENT)
Dept: FAMILY MEDICINE | Facility: CLINIC | Age: 70
End: 2025-06-26
Payer: MEDICARE

## 2025-06-26 ENCOUNTER — TELEPHONE (OUTPATIENT)
Dept: FAMILY MEDICINE | Facility: CLINIC | Age: 70
End: 2025-06-26

## 2025-06-26 VITALS
WEIGHT: 244 LBS | HEART RATE: 67 BPM | HEIGHT: 71 IN | BODY MASS INDEX: 34.16 KG/M2 | SYSTOLIC BLOOD PRESSURE: 139 MMHG | DIASTOLIC BLOOD PRESSURE: 72 MMHG | OXYGEN SATURATION: 98 %

## 2025-06-26 DIAGNOSIS — I49.9 IRREGULAR HEART RATE: ICD-10-CM

## 2025-06-26 DIAGNOSIS — F01.A0 MILD VASCULAR DEMENTIA WITHOUT BEHAVIORAL DISTURBANCE, PSYCHOTIC DISTURBANCE, MOOD DISTURBANCE, OR ANXIETY: ICD-10-CM

## 2025-06-26 DIAGNOSIS — E11.9 TYPE 2 DIABETES MELLITUS WITHOUT COMPLICATION, WITHOUT LONG-TERM CURRENT USE OF INSULIN: Primary | ICD-10-CM

## 2025-06-26 DIAGNOSIS — I69.351 HEMIPLEGIA AND HEMIPARESIS FOLLOWING CEREBRAL INFARCTION AFFECTING RIGHT DOMINANT SIDE: ICD-10-CM

## 2025-06-26 PROCEDURE — 3008F BODY MASS INDEX DOCD: CPT | Mod: CPTII,S$GLB,, | Performed by: NURSE PRACTITIONER

## 2025-06-26 PROCEDURE — 3078F DIAST BP <80 MM HG: CPT | Mod: CPTII,S$GLB,, | Performed by: NURSE PRACTITIONER

## 2025-06-26 PROCEDURE — 3075F SYST BP GE 130 - 139MM HG: CPT | Mod: CPTII,S$GLB,, | Performed by: NURSE PRACTITIONER

## 2025-06-26 PROCEDURE — 99214 OFFICE O/P EST MOD 30 MIN: CPT | Mod: S$GLB,,, | Performed by: NURSE PRACTITIONER

## 2025-06-26 PROCEDURE — 3044F HG A1C LEVEL LT 7.0%: CPT | Mod: CPTII,S$GLB,, | Performed by: NURSE PRACTITIONER

## 2025-06-26 PROCEDURE — 1159F MED LIST DOCD IN RCRD: CPT | Mod: CPTII,S$GLB,, | Performed by: NURSE PRACTITIONER

## 2025-06-26 PROCEDURE — 1160F RVW MEDS BY RX/DR IN RCRD: CPT | Mod: CPTII,S$GLB,, | Performed by: NURSE PRACTITIONER

## 2025-06-26 RX ORDER — ZINC GLUCONATE 50 MG
50 TABLET ORAL EVERY MORNING
COMMUNITY

## 2025-06-26 RX ORDER — SEMAGLUTIDE 0.68 MG/ML
0.5 INJECTION, SOLUTION SUBCUTANEOUS
Qty: 12 EACH | Refills: 0 | Status: SHIPPED | OUTPATIENT
Start: 2025-06-26

## 2025-06-26 NOTE — PROGRESS NOTES
Patient ID: Jamal Luna  MRN: [id number]      History of Present Illness:  The patient is a 70 y.o. White male who presents to clinic for evaluation and management with a chief complaint of Follow-up     Last clinic visit Mr. Luna Ozempic was increased from 2.5 mg to 5 mg once a week and he has angie well without any abdominal pain or nausea, however he has not lost any weight. He will be for a recheck of A1c in about 6 weeks. His wife has cancer, but he said she is doing well.     Pt did see Dr. Efrain Snider for his history of polpys and Dr. Snider was able to review his prior records from when he was in [place] and told him he did not need to return for another 10 years , at which time he would evaluate the necessity for this and did advise him of warning signs to report, such as change in color stool, rectal bleeding etc.     Pt has also seen Dr. Bardales, cardiologist for irregular heart rate and we have again requested those records. He does have a hx of Cva and is taking Plavix . Currently he said he is walking about 2 miles every other day and is doing well. No falls in a few months. Today he said he does not have any knee or back pain. He has an upcoming apt to see pulmonology , Dr. Solis for an evaluation due to his hx of COPD.      Allergies: Patient has no known allergies.     Smoking status:  Tobacco Use History[1]    Problem List:  Problem List[2]     Current Medications:  Current Outpatient Medications   Medication Instructions    atorvastatin (LIPITOR) 80 mg, Oral, Nightly    BREZTRI AEROSPHERE 160-9-4.8 mcg/actuation HFAA     clopidogreL (PLAVIX) 75 mg tablet TAKE 1 TABLET EVERY DAY    LYCOPENE ORAL 1 capsule, Every morning    memantine (NAMENDA) 10 MG Tab     multivitamin capsule 1 capsule, Daily    omega-3 fatty acids/fish oil (FISH OIL-OMEGA-3 FATTY ACIDS) 300-1,000 mg capsule Daily    OZEMPIC 0.5 mg, Subcutaneous, Every 7 days    zinc gluconate 50 mg, Every morning  "            Review of Systems   Constitutional: Negative.    HENT: Negative.     Eyes: Negative.    Respiratory: Negative.     Cardiovascular: Negative.    Gastrointestinal: Negative.    Endocrine: Negative.    Genitourinary: Negative.    Musculoskeletal: Negative.    Integumentary:  Negative.   Allergic/Immunologic: Negative.    Neurological: Negative.    Hematological: Negative.    Psychiatric/Behavioral: Negative.          Visit Vitals  /72 (BP Location: Left arm, Patient Position: Sitting)   Pulse 67   Ht 5' 11" (1.803 m)   Wt 110.7 kg (244 lb)   SpO2 98%   BMI 34.03 kg/m²       Physical Exam  Vitals and nursing note reviewed.   Constitutional:       Appearance: Normal appearance.   HENT:      Head: Normocephalic.      Nose: Nose normal.      Mouth/Throat:      Mouth: Mucous membranes are moist.      Pharynx: Oropharynx is clear.   Eyes:      Conjunctiva/sclera: Conjunctivae normal.   Cardiovascular:      Rate and Rhythm: Normal rate and regular rhythm.   Pulmonary:      Effort: Pulmonary effort is normal.      Breath sounds: Normal breath sounds.   Abdominal:      General: Bowel sounds are normal.      Palpations: Abdomen is soft.   Musculoskeletal:         General: Normal range of motion.      Cervical back: Normal range of motion.   Skin:     General: Skin is warm and dry.   Neurological:      General: No focal deficit present.      Mental Status: He is alert.   Psychiatric:         Mood and Affect: Mood normal.         Behavior: Behavior normal.          Assessment & Plan:  1. Type 2 diabetes mellitus without complication, without long-term current use of insulin  -     semaglutide (OZEMPIC) 0.25 mg or 0.5 mg (2 mg/3 mL) pen injector; Inject 0.5 mg into the skin every 7 days.  Dispense: 12 each; Refill: Reviewed glucose reading. Instructed importance of following low carb consistent meals and monitoring glucose levels at home. Reviewed recommended goals for blood sugar levels per ADA : am fasting "  and 2 hour post prandial 140-160. Discussed importance of daily exercise and recommended 30 min of walking at least 5 times a week. Reviewed possible complications of uncontrolled blood glucose levels including micro and macrovascular changes. Instructed need for yearly eye exam and daily inspection of feet.      2. Irregular heart rate  Pt denies feeling sob or lightheaded. He is walking 2 miles every other day. He has seen Cardiologist, Dr. Bardales and we have again requested his progress notes.     3. Mild vascular dementia without behavioral disturbance, psychotic disturbance, mood disturbance, or anxiety  He continues to take Namenda and he is alert and oriented x 3 . He is a good historian today and is able to accurately recall events   Times and dates. Continue current medication and physical exercise.       4. Hemiplegia and hemiparesis following cerebral infarction affecting right dominant side  Demonstrates no other signs or sx of CVA and is continuing Plavix daily.        Future Appointments   Date Time Provider Department Center   7/23/2025  9:00 AM PFT LAB, [facility] PFT [facility] PFT  [address]   7/23/2025 10:00 AM Avelino Solis MD [facility] PULM  [address]   7/29/2025  8:20 AM Amira Cohen NP [facility] [place]  [address]   8/7/2025  8:40 AM [facility] Cohen NP [facility]       Lab Frequency Next Occurrence   Ambulatory referral/consult to Physical/Occupational Therapy Once 09/05/2024   Ambulatory referral/consult to Pulmonology Once 04/16/2025   Ambulatory referral/consult to Gastroenterology Once 04/16/2025   CBC Auto Differential Once 04/09/2025   Comprehensive Metabolic Panel Once 04/09/2025   Hemoglobin A1C Once 04/09/2025   Lipid Panel Once 04/09/2025   TSH Once 04/09/2025   Ambulatory referral/consult to Cardiology Once 05/01/2025   X-Ray Knee 3 View Right Once 05/22/2025         Follow up in about 6 weeks (around 8/7/2025).    Amira Cohen NP                [1]   Social  History  Tobacco Use   Smoking Status Former    Types: Cigarettes    Start date:     Quit date:     Years since quittin.5    Passive exposure: Never   Smokeless Tobacco Never   [2]   Patient Active Problem List  Diagnosis    Erectile dysfunction    Hyperlipidemia    Chronic obstructive pulmonary disease    Insomnia    History of cardioembolic cerebrovascular accident (CVA)    History of shingles    Skin lesion of hand    Prediabetes    Age-related cataract of both eyes    History of colon polyps    Morbid (severe) obesity due to excess calories    Pseudopolyposis of colon without complication, unspecified part of colon    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

## 2025-07-23 ENCOUNTER — CLINICAL SUPPORT (OUTPATIENT)
Dept: PULMONOLOGY | Facility: CLINIC | Age: 70
End: 2025-07-23
Payer: MEDICARE

## 2025-07-23 ENCOUNTER — HOSPITAL ENCOUNTER (OUTPATIENT)
Dept: RADIOLOGY | Facility: CLINIC | Age: 70
Discharge: HOME | End: 2025-07-23
Attending: INTERNAL MEDICINE
Payer: MEDICARE

## 2025-07-23 ENCOUNTER — OFFICE VISIT (OUTPATIENT)
Dept: PULMONOLOGY | Facility: CLINIC | Age: 70
End: 2025-07-23
Payer: MEDICARE

## 2025-07-23 VITALS
SYSTOLIC BLOOD PRESSURE: 127 MMHG | RESPIRATION RATE: 20 BRPM | OXYGEN SATURATION: 97 % | HEART RATE: 50 BPM | DIASTOLIC BLOOD PRESSURE: 53 MMHG | WEIGHT: 243 LBS | HEIGHT: 71 IN | BODY MASS INDEX: 34.02 KG/M2

## 2025-07-23 DIAGNOSIS — R91.1 LUNG NODULE: ICD-10-CM

## 2025-07-23 DIAGNOSIS — J44.9 CHRONIC OBSTRUCTIVE PULMONARY DISEASE, UNSPECIFIED COPD TYPE: ICD-10-CM

## 2025-07-23 DIAGNOSIS — R91.1 SOLITARY PULMONARY NODULE: ICD-10-CM

## 2025-07-23 DIAGNOSIS — J44.9 CHRONIC OBSTRUCTIVE PULMONARY DISEASE, UNSPECIFIED COPD TYPE: Primary | ICD-10-CM

## 2025-07-23 DIAGNOSIS — G47.33 OSA (OBSTRUCTIVE SLEEP APNEA): ICD-10-CM

## 2025-07-23 DIAGNOSIS — Z12.2 ENCOUNTER FOR SCREENING FOR LUNG CANCER: ICD-10-CM

## 2025-07-23 DIAGNOSIS — F17.211 CIGARETTE NICOTINE DEPENDENCE IN REMISSION: Primary | ICD-10-CM

## 2025-07-23 PROCEDURE — 3044F HG A1C LEVEL LT 7.0%: CPT | Mod: CPTII,,, | Performed by: INTERNAL MEDICINE

## 2025-07-23 PROCEDURE — 99205 OFFICE O/P NEW HI 60 MIN: CPT | Mod: S$PBB,25,, | Performed by: INTERNAL MEDICINE

## 2025-07-23 PROCEDURE — 3008F BODY MASS INDEX DOCD: CPT | Mod: CPTII,,, | Performed by: INTERNAL MEDICINE

## 2025-07-23 PROCEDURE — 71046 X-RAY EXAM CHEST 2 VIEWS: CPT | Mod: 26,,, | Performed by: STUDENT IN AN ORGANIZED HEALTH CARE EDUCATION/TRAINING PROGRAM

## 2025-07-23 PROCEDURE — 1160F RVW MEDS BY RX/DR IN RCRD: CPT | Mod: CPTII,,, | Performed by: INTERNAL MEDICINE

## 2025-07-23 PROCEDURE — 3078F DIAST BP <80 MM HG: CPT | Mod: CPTII,,, | Performed by: INTERNAL MEDICINE

## 2025-07-23 PROCEDURE — 3074F SYST BP LT 130 MM HG: CPT | Mod: CPTII,,, | Performed by: INTERNAL MEDICINE

## 2025-07-23 PROCEDURE — 1159F MED LIST DOCD IN RCRD: CPT | Mod: CPTII,,, | Performed by: INTERNAL MEDICINE

## 2025-07-23 RX ORDER — IPRATROPIUM BROMIDE AND ALBUTEROL SULFATE 2.5; .5 MG/3ML; MG/3ML
3 SOLUTION RESPIRATORY (INHALATION) EVERY 12 HOURS
Qty: 180 ML | Refills: 11 | Status: SHIPPED | OUTPATIENT
Start: 2025-07-23 | End: 2026-07-23

## 2025-07-23 NOTE — PROGRESS NOTES
Subjective:    Patient Identification:   Patient ID: Jamal Luna is a 70 y.o. male.    Referring Provider:  Amira Cohen     Chief Complaint:  COPD      History of Present Illness:    Jamal Luna is a 70 y.o. male who presents to establish care for the management of COPD.    Patient has smoked from 1970 to till little after 2010 anywhere from 1-1/2 pack a day to 2 packs a day.  He was diagnosed with COPD back in 90s.  He had been on Trelegy with good control of his symptoms unless he could not afford it and was switched to Breztri.  Lately has noted occasional wheezing with productive cough.  No shortness of breath can walk up to 2-3 miles every day.  Thinks he is up-to-date on vaccinations but has not received any vaccination since moved from [place] 1-1/2 year ago.  Has history of a mini-stroke back in 2019.  Has worked as a .  Has had no acute exacerbations of COPD in last 1 year.  Does have albuterol rescue inhaler but does not use it.  Does have a nebulizer but the medicines are outdated.  Was found to have a spot on his lung which was followed up with serial CAT scans and was told finally that this had disappeared.  Has not had a low-dose CT scans is moved from [place] 1-1/2 year ago.  Uses Mucinex every day and it does seem to help some.  He does have obstructive sleep apnea and is currently on CPAP and is compliant.  Denies any issues.    Review of Systems:  Review of Systems   Constitutional:  Negative for fever, chills, weight loss, weight gain, activity change, appetite change, fatigue, night sweats and weakness.   HENT:  Negative for nosebleeds, postnasal drip, rhinorrhea, sinus pressure, sore throat, trouble swallowing, voice change, congestion, ear pain and hearing loss.    Eyes:  Negative for redness and itching.   Respiratory:  Positive for cough and wheezing. Negative for hemoptysis, sputum production, choking, chest tightness, shortness of breath,  orthopnea, previous hospitialization due to pulmonary problems, asthma nighttime symptoms, pleurisy, dyspnea on extertion, use of rescue inhaler and Paroxysmal Nocturnal Dyspnea.    Cardiovascular:  Negative for chest pain, palpitations and leg swelling.   Genitourinary:  Negative for difficulty urinating and hematuria.   Endocrine:  Negative for polydipsia, polyphagia, cold intolerance, heat intolerance and polyuria.    Musculoskeletal:  Negative for arthralgias, back pain, gait problem, joint swelling and myalgias.   Skin:  Negative for rash.   Gastrointestinal:  Negative for nausea, vomiting, abdominal pain, abdominal distention and acid reflux.   Neurological:  Negative for dizziness, syncope, weakness, light-headedness and headaches.   Hematological:  Negative for adenopathy. Does not bruise/bleed easily and no excessive bruising.   Psychiatric/Behavioral:  Negative for confusion and sleep disturbance. The patient is not nervous/anxious.          Allergies: Review of patient's allergies indicates:  No Known Allergies    Medications:      Past Medical History:      Past Medical History:   Diagnosis Date    COPD (chronic obstructive pulmonary disease)     Erectile dysfunction     Hyperlipidemia     Stroke 07/2019       Family History:      Family History   Problem Relation Name Age of Onset    Heart disease Father      Heart disease Sister      Heart disease Brother          Social History:      Past Surgical History:   Procedure Laterality Date    PROSTATE SURGERY  2023    urplift implant       Physical Exam:  Vitals:    07/23/25 0931   BP: (!) 127/53   Pulse: (!) 50   Resp: 20     Physical Exam   Constitutional: He is oriented to person, place, and time. He appears not cachectic. No distress. He is obese.   HENT:   Head: Normocephalic.   Nose: Nose normal. No mucosal edema. No polyps.   Mouth/Throat: Oropharynx is clear and moist. Normal dentition. No oropharyngeal exudate.   Neck: No JVD present. No tracheal  deviation present. No thyromegaly present.   Cardiovascular: Normal rate, regular rhythm, normal heart sounds and intact distal pulses. Exam reveals no gallop and no friction rub.   No murmur heard.  Pulmonary/Chest: Normal expansion, symmetric chest wall expansion, effort normal and breath sounds normal. No stridor. No respiratory distress. He has no decreased breath sounds. He has no wheezes. He has no rhonchi. He has no rales. Chest wall is not dull to percussion. He exhibits no tenderness. Negative for egophony. Negative for tactile fremitus.   Abdominal: Soft. Bowel sounds are normal. He exhibits no distension and no mass. There is no hepatosplenomegaly. There is no abdominal tenderness. There is no rebound and no guarding. No hernia.   Musculoskeletal:         General: No tenderness, deformity or edema. Normal range of motion.      Cervical back: Normal range of motion and neck supple.   Lymphadenopathy: No supraclavicular adenopathy is present.     He has no cervical adenopathy.     He has no axillary adenopathy.   Neurological: He is alert and oriented to person, place, and time. He displays normal reflexes. No cranial nerve deficit. He exhibits normal muscle tone.   Skin: Skin is warm and dry. No rash noted. He is not diaphoretic. No cyanosis or erythema. No pallor. Nails show no clubbing.   Psychiatric: He has a normal mood and affect. His behavior is normal. Judgment and thought content normal.           Accessory Clinical Data:  Chest x-ray:  No acute findings on my review    CT scan:     PFTs:  Normal profile    6MWT:     TTE:    Lab data:    All radiographic imaging of the chest, PFT tracings/data, and 6MWT data have been independently reviewed and interpreted unless otherwise specified.     Assessment and Plan:        Problem List Items Addressed This Visit          Pulmonary    Chronic obstructive pulmonary disease    Relevant Medications    albuterol-ipratropium (DUO-NEB) 2.5 mg-0.5 mg/3 mL  nebulizer solution     Other Visit Diagnoses         Cigarette nicotine dependence in remission    -  Primary      CARRILLO (obstructive sleep apnea)          Encounter for screening for lung cancer          Lung nodule          Solitary pulmonary nodule        Relevant Orders    CT Chest Without Contrast          Continue with Breztri.  Recommend age-appropriate seasonal preventative vaccinations from primary care provider.  Send prescription for DuoNebs to be used preferably twice a day or at least once in the morning via nebulizer.  Continue with CPAP for obstructive sleep apnea management.    Obtain CT chest without contrast, ion protocol to evaluate patient's reported lung nodule diagnosed in [place] given high-risk for lung cancer from extensive tobacco history.  If anything suspicious, we will follow up with the patient otherwise we will see him back in 1 year.    65 minutes of total time was spent the encounter, which includes face-to-face time on history and examination and non face-to-face time preparing to see the patient that includes reviewing the images, labs, PFTs, obtaining and reviewing separately obtained history, documenting clinical information in the electronic health record, independently interpreting results and communicating results to the patient, as well as care coordination.        Follow up in about 1 year (around 7/23/2026).  Thank you very much for involving me in the care of this patient.  Please do not hesitate to reach me if you have any further questions or concerns.    This note is dictated on M*Modal word recognition program.  There are word recognition mistakes that are occasionally missed on review.

## 2025-07-29 ENCOUNTER — OFFICE VISIT (OUTPATIENT)
Dept: FAMILY MEDICINE | Facility: CLINIC | Age: 70
End: 2025-07-29
Payer: MEDICARE

## 2025-07-29 ENCOUNTER — TELEPHONE (OUTPATIENT)
Dept: FAMILY MEDICINE | Facility: CLINIC | Age: 70
End: 2025-07-29

## 2025-07-29 VITALS
DIASTOLIC BLOOD PRESSURE: 80 MMHG | BODY MASS INDEX: 34.44 KG/M2 | OXYGEN SATURATION: 97 % | TEMPERATURE: 98 F | WEIGHT: 246 LBS | RESPIRATION RATE: 15 BRPM | HEIGHT: 71 IN | SYSTOLIC BLOOD PRESSURE: 120 MMHG | HEART RATE: 60 BPM

## 2025-07-29 DIAGNOSIS — E11.9 TYPE 2 DIABETES MELLITUS WITHOUT COMPLICATION, WITHOUT LONG-TERM CURRENT USE OF INSULIN: Primary | ICD-10-CM

## 2025-07-29 LAB — HBA1C MFR BLD: 5.9 % (ref 4–6)

## 2025-07-29 PROCEDURE — 3288F FALL RISK ASSESSMENT DOCD: CPT | Mod: CPTII,S$GLB,, | Performed by: NURSE PRACTITIONER

## 2025-07-29 PROCEDURE — 3008F BODY MASS INDEX DOCD: CPT | Mod: CPTII,S$GLB,, | Performed by: NURSE PRACTITIONER

## 2025-07-29 PROCEDURE — 1159F MED LIST DOCD IN RCRD: CPT | Mod: CPTII,S$GLB,, | Performed by: NURSE PRACTITIONER

## 2025-07-29 PROCEDURE — 1126F AMNT PAIN NOTED NONE PRSNT: CPT | Mod: CPTII,S$GLB,, | Performed by: NURSE PRACTITIONER

## 2025-07-29 PROCEDURE — 99214 OFFICE O/P EST MOD 30 MIN: CPT | Mod: S$GLB,,, | Performed by: NURSE PRACTITIONER

## 2025-07-29 PROCEDURE — 1101F PT FALLS ASSESS-DOCD LE1/YR: CPT | Mod: CPTII,S$GLB,, | Performed by: NURSE PRACTITIONER

## 2025-07-29 PROCEDURE — 3079F DIAST BP 80-89 MM HG: CPT | Mod: CPTII,S$GLB,, | Performed by: NURSE PRACTITIONER

## 2025-07-29 PROCEDURE — 3074F SYST BP LT 130 MM HG: CPT | Mod: CPTII,S$GLB,, | Performed by: NURSE PRACTITIONER

## 2025-07-29 PROCEDURE — 1160F RVW MEDS BY RX/DR IN RCRD: CPT | Mod: CPTII,S$GLB,, | Performed by: NURSE PRACTITIONER

## 2025-07-29 PROCEDURE — 3044F HG A1C LEVEL LT 7.0%: CPT | Mod: CPTII,S$GLB,, | Performed by: NURSE PRACTITIONER

## 2025-07-29 PROCEDURE — 83036 HEMOGLOBIN GLYCOSYLATED A1C: CPT | Mod: QW,,, | Performed by: NURSE PRACTITIONER

## 2025-07-29 RX ORDER — SEMAGLUTIDE 1.34 MG/ML
1 INJECTION, SOLUTION SUBCUTANEOUS
Qty: 3 ML | Refills: 1 | Status: SHIPPED | OUTPATIENT
Start: 2025-07-29 | End: 2026-07-29

## 2025-07-29 NOTE — PROGRESS NOTES
Patient ID: Jamal Luna  MRN: [id number]      History of Present Illness:  The patient is a 70 y.o. White male who presents to clinic for evaluation and management with a chief complaint of Follow-up (Pt here for 2mth F/U.  No c/o today.) He had been referred to Dr. Solis for evaluation of his COPD. He did see him and Dr. Solis said he is doing very well with normal PFT functions . He has scheduled him for a CT scan of his lungs or is going to have this set up . He does have a hx of smoking so needs this for lung cancer screening and per patient a prior nodule noted on scan.     He saw Dr. Bardales recently for his irregular heart rate , however no medications were changed or added and pt denies any chest pain , sob, lightheadedness or weakness.     Last visit the Ozempic dose was increased from 0.25 mg to .5 mg , however, he has not lost any weight. He said he is really concerned about his weight, he is angie the Ozempic well without side effects so would like to increase dose to 1 mg once a week.     Today his A1c is 5.9 %     Allergies: Patient has no known allergies.     Smoking status:  Tobacco Use History[1]    Problem List:  Problem List[2]     Current Medications:  Current Outpatient Medications   Medication Instructions    albuterol-ipratropium (DUO-NEB) 2.5 mg-0.5 mg/3 mL nebulizer solution 3 mLs, Nebulization, Every 12 hours, Rescue    ascorbic acid (vitamin C) (VITAMIN C) 100 mg, Daily    atorvastatin (LIPITOR) 80 mg, Oral, Nightly    BREZTRI AEROSPHERE 160-9-4.8 mcg/actuation HFAA     clopidogreL (PLAVIX) 75 mg tablet TAKE 1 TABLET EVERY DAY    memantine (NAMENDA) 10 MG Tab     multivitamin capsule 1 capsule, Daily    omega-3 fatty acids/fish oil (FISH OIL-OMEGA-3 FATTY ACIDS) 300-1,000 mg capsule Daily    OZEMPIC 1 mg, Subcutaneous, Every 7 days    zinc gluconate 50 mg, Every morning           Review of Systems   Constitutional: Negative.    HENT: Negative.     Eyes: Negative.    Respiratory:  "Negative.     Cardiovascular: Negative.    Gastrointestinal: Negative.    Endocrine: Negative.    Genitourinary: Negative.    Musculoskeletal: Negative.    Integumentary:  Negative.   Allergic/Immunologic: Negative.    Neurological: Negative.    Hematological: Negative.    Psychiatric/Behavioral: Negative.          Visit Vitals  /80 (BP Location: Left arm, Patient Position: Sitting)   Pulse 60   Temp 97.8 °F (36.6 °C) (Oral)   Resp 15   Ht 5' 11" (1.803 m)   Wt 111.6 kg (246 lb)   SpO2 97%   BMI 34.31 kg/m²       Physical Exam  Vitals and nursing note reviewed.   Constitutional:       Appearance: Normal appearance.   HENT:      Head: Normocephalic.      Nose: Nose normal.      Mouth/Throat:      Mouth: Mucous membranes are moist.      Pharynx: Oropharynx is clear.   Eyes:      Conjunctiva/sclera: Conjunctivae normal.   Cardiovascular:      Rate and Rhythm: Normal rate and regular rhythm.   Pulmonary:      Effort: Pulmonary effort is normal.      Breath sounds: Normal breath sounds.   Abdominal:      General: Bowel sounds are normal.      Palpations: Abdomen is soft.   Musculoskeletal:         General: Normal range of motion.      Cervical back: Normal range of motion.   Skin:     General: Skin is warm and dry.   Neurological:      General: No focal deficit present.      Mental Status: He is alert.   Psychiatric:         Mood and Affect: Mood normal.         Behavior: Behavior normal.          Assessment & Plan:  1. Type 2 diabetes mellitus without complication, without long-term current use of insulin  -     semaglutide (OZEMPIC) 1 mg/dose (4 mg/3 mL); Inject 1 mg into the skin every 7 days.  Dispense: 3 mL; Refill: 1  -     Hemoglobin A1C, POCT  Reviewed A1c with patient which represents excellent control, however he is concerned that he is still not losing weight and he would like to lose weight. Currently he is taking the 0.5 mg dose and has asked to have this increased. We discussed again [facility] of action " and possible SE to report. He will let me know if he is having any issues otherwise we will see him back in 3 months.          Future Appointments   Date Time Provider Department Center   2025  8:40 AM Amira Cohen NP [facility] [address]     Lab Frequency Next Occurrence   Ambulatory referral/consult to Physical/Occupational Therapy Once 2024   Ambulatory referral/consult to Gastroenterology Once 2025   CBC Auto Differential Once 2025   Comprehensive Metabolic Panel Once 2025   Hemoglobin A1C Once 2025   Lipid Panel Once 2025   TSH Once 2025   Ambulatory referral/consult to Cardiology Once 2025   X-Ray Knee 3 View Right Once 2025   CT Chest Without Contrast Once 2025     Follow up in about 3 months (around 10/29/2025).    Amira Cohen NP                [1]   Social History  Tobacco Use   Smoking Status Former    Types: Cigarettes    Start date:     Quit date: 1972    Years since quittin.6    Passive exposure: Never   Smokeless Tobacco Never   [2]   Patient Active Problem List  Diagnosis    Erectile dysfunction    Hyperlipidemia    Chronic obstructive pulmonary disease    Insomnia    History of cardioembolic cerebrovascular accident (CVA)    History of shingles    Skin lesion of hand    Prediabetes    Age-related cataract of both eyes    History of colon polyps    Morbid (severe) obesity due to excess calories    Pseudopolyposis of colon without complication, unspecified part of colon    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

## 2025-07-31 ENCOUNTER — PATIENT MESSAGE (OUTPATIENT)
Dept: FAMILY MEDICINE | Facility: CLINIC | Age: 70
End: 2025-07-31
Payer: MEDICARE

## 2025-07-31 RX ORDER — TAMSULOSIN HYDROCHLORIDE 0.4 MG/1
0.4 CAPSULE ORAL DAILY
Qty: 30 CAPSULE | Refills: 2 | Status: SHIPPED | OUTPATIENT
Start: 2025-07-31 | End: 2026-07-31

## 2025-08-07 ENCOUNTER — OFFICE VISIT (OUTPATIENT)
Dept: FAMILY MEDICINE | Facility: CLINIC | Age: 70
End: 2025-08-07
Payer: MEDICARE

## 2025-08-07 ENCOUNTER — PATIENT MESSAGE (OUTPATIENT)
Dept: FAMILY MEDICINE | Facility: CLINIC | Age: 70
End: 2025-08-07

## 2025-08-07 VITALS
OXYGEN SATURATION: 98 % | BODY MASS INDEX: 39.06 KG/M2 | HEART RATE: 51 BPM | HEIGHT: 71 IN | DIASTOLIC BLOOD PRESSURE: 76 MMHG | WEIGHT: 279 LBS | SYSTOLIC BLOOD PRESSURE: 130 MMHG

## 2025-08-07 DIAGNOSIS — E11.9 TYPE 2 DIABETES MELLITUS WITHOUT COMPLICATION, WITHOUT LONG-TERM CURRENT USE OF INSULIN: ICD-10-CM

## 2025-08-07 DIAGNOSIS — Z86.73 HISTORY OF CARDIOEMBOLIC CEREBROVASCULAR ACCIDENT (CVA): ICD-10-CM

## 2025-08-07 DIAGNOSIS — D64.9 ANEMIA, UNSPECIFIED TYPE: Primary | ICD-10-CM

## 2025-08-07 DIAGNOSIS — R00.1 BRADYCARDIA: ICD-10-CM

## 2025-08-07 PROCEDURE — 1160F RVW MEDS BY RX/DR IN RCRD: CPT | Mod: CPTII,S$GLB,, | Performed by: NURSE PRACTITIONER

## 2025-08-07 PROCEDURE — 99214 OFFICE O/P EST MOD 30 MIN: CPT | Mod: S$GLB,,, | Performed by: NURSE PRACTITIONER

## 2025-08-07 PROCEDURE — 1159F MED LIST DOCD IN RCRD: CPT | Mod: CPTII,S$GLB,, | Performed by: NURSE PRACTITIONER

## 2025-08-07 PROCEDURE — 3078F DIAST BP <80 MM HG: CPT | Mod: CPTII,S$GLB,, | Performed by: NURSE PRACTITIONER

## 2025-08-07 PROCEDURE — 3008F BODY MASS INDEX DOCD: CPT | Mod: CPTII,S$GLB,, | Performed by: NURSE PRACTITIONER

## 2025-08-07 PROCEDURE — 3044F HG A1C LEVEL LT 7.0%: CPT | Mod: CPTII,S$GLB,, | Performed by: NURSE PRACTITIONER

## 2025-08-07 PROCEDURE — 3075F SYST BP GE 130 - 139MM HG: CPT | Mod: CPTII,S$GLB,, | Performed by: NURSE PRACTITIONER

## 2025-08-07 NOTE — PROGRESS NOTES
"Patient ID: Jamal Luna  MRN: [id number]      History of Present Illness:  The patient is a 70 y.o. White male who presents to clinic for Follow-up of irregular heart rate. He was referred to Dr. Bardales for review . We did receive records and per note , Dr. Bardales said he had some intermittent PVC's but overall no issues with EKG. Pt denies any chest pain or sob and has recently resumed his walking 2 miles per day after getting a new treadmill. His wife has liver cancer and he takes her back and forth to her apts. She has recently had chemo.     Pt is feeling well in general and has no c/o today. I have asked him to keep track of his bp and pulse rate and to notify us if heart rate /pulse goes below 50.     He did tell Dr. Bardales he does not have DM, however based on labs and ADA criteria he does and is currently on Ozempic and is angie well. His most recent A1c was excellent after starting the Ozempic, however he had not lost any weight so we have increased the dose.     He was recently referred to GI, Dr. Efrain Snider for possible C-scope, due to "multiple polyps" being noted on outside GI records (pt recently moved here ). [facility] Dr. Snider said these were small polyps and not worrisome and he will not likely need another colonoscopy for 10 years unless he develops sx  I am concerned about the mild anemia noted on recent labs so patient has agreed to do the FIT kit test (stool for occult blood )     Allergies: Patient has no known allergies.     Smoking status:  Tobacco Use History[1]    Problem List:  Problem List[2]     Current Medications:  Current Outpatient Medications   Medication Instructions    ascorbic acid (vitamin C) (VITAMIN C) 100 mg, Daily    atorvastatin (LIPITOR) 80 mg, Oral, Nightly    BREZTRI AEROSPHERE 160-9-4.8 mcg/actuation HFAA     clopidogreL (PLAVIX) 75 mg tablet TAKE 1 TABLET EVERY DAY    multivitamin capsule 1 capsule, Daily " "   OZEMPIC 1 mg, Subcutaneous, Every 7 days    tamsulosin (FLOMAX) 0.4 mg, Oral, Daily    zinc gluconate 50 mg, Every morning             Review of Systems   Constitutional: Negative.    HENT: Negative.     Eyes: Negative.    Respiratory: Negative.     Cardiovascular: Negative.    Gastrointestinal: Negative.    Endocrine: Negative.    Genitourinary: Negative.    Musculoskeletal: Negative.    Integumentary:  Negative.   Allergic/Immunologic: Negative.    Neurological: Negative.    Hematological: Negative.    Psychiatric/Behavioral: Negative.          Visit Vitals  /76 (BP Location: Left arm, Patient Position: Sitting)   Pulse (!) 51   Ht 5' 11" (1.803 m)   Wt 126.6 kg (279 lb)   SpO2 98%   BMI 38.91 kg/m²       Physical Exam  Vitals and nursing note reviewed.   Constitutional:       Appearance: Normal appearance.   HENT:      Head: Normocephalic.      Nose: Nose normal.      Mouth/Throat:      Mouth: Mucous membranes are moist.      Pharynx: Oropharynx is clear.   Eyes:      Conjunctiva/sclera: Conjunctivae normal.   Cardiovascular:      Rate and Rhythm: Bradycardia present. Rhythm irregular.      Comments: Slow rate and some irregular beats noted   Pulmonary:      Effort: Pulmonary effort is normal.      Breath sounds: Normal breath sounds.   Abdominal:      General: Bowel sounds are normal.      Palpations: Abdomen is soft.   Musculoskeletal:         General: Normal range of motion.      Cervical back: Normal range of motion.   Skin:     General: Skin is warm and dry.   Neurological:      General: No focal deficit present.      Mental Status: He is alert.   Psychiatric:         Mood and Affect: Mood normal.         Behavior: Behavior normal.          Assessment & Plan:  1. Anemia, unspecified type  -     Fecal Immunochemical Test (iFOBT); Future; Expected date: 08/07/2025    2. Bradycardia  Reviewed the Cardiac referral assessment per Dr. Bardales with patient, he currently is asymptomatic. Advised to monitor " bp and pulse periodically and to notify me if any has any lightheadedness or dizziness.       3. Type 2 diabetes mellitus without complication, without long-term current use of insulin  We have increased the Ozempic to 1 mg in effort to help patient improve glucose levels but also lose weight   He is angie well. He has been instructed to notify me of any abdominal pain , nausea or vomiting , otherwise will follow up in 3 months.         4. History of cardioembolic cerebrovascular accident (CVA)  Overview:  right sided weakness  Coronary CT scan was elevated, pt referred to Dr. Bardales who reviewed records, no interventions at this time. EKG was performed in office and noted to have some PVC's. Recommended continue Plavix and Statin. BP is controlled.          Future Appointments   Date Time Provider Department Center   2026  8:40 AM Avelino Solis MD [facility]  [place]       Lab Frequency Next Occurrence   Ambulatory referral/consult to Physical/Occupational Therapy Once 2024   Ambulatory referral/consult to Gastroenterology Once 2025   CBC Auto Differential Once 2025   Comprehensive Metabolic Panel Once 2025   Hemoglobin A1C Once 2025   Lipid Panel Once 2025   TSH Once 2025   Ambulatory referral/consult to Cardiology Once 2025   X-Ray Knee 3 View Right Once 2025   CT Chest Without Contrast Once 2025       Follow up in about 3 months (around 2025).      Amira Cohen NP                [1]   Social History  Tobacco Use   Smoking Status Former    Types: Cigarettes    Start date:     Quit date: 1972    Years since quittin.6    Passive exposure: Never   Smokeless Tobacco Never   [2]   Patient Active Problem List  Diagnosis    Erectile dysfunction    Hyperlipidemia    Chronic obstructive pulmonary disease    Insomnia    History of cardioembolic cerebrovascular accident (CVA)    History of shingles    Skin lesion of hand    Prediabetes     Age-related cataract of both eyes    History of colon polyps    Morbid (severe) obesity due to excess calories    Pseudopolyposis of colon without complication, unspecified part of colon    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

## 2025-08-14 ENCOUNTER — TELEPHONE (OUTPATIENT)
Dept: FAMILY MEDICINE | Facility: CLINIC | Age: 70
End: 2025-08-14
Payer: MEDICARE

## 2025-08-15 DIAGNOSIS — Z86.73 HISTORY OF CARDIOEMBOLIC CEREBROVASCULAR ACCIDENT (CVA): ICD-10-CM

## 2025-08-19 RX ORDER — ATORVASTATIN CALCIUM 80 MG/1
80 TABLET, FILM COATED ORAL NIGHTLY
Qty: 90 TABLET | Refills: 0 | Status: SHIPPED | OUTPATIENT
Start: 2025-08-19 | End: 2025-11-17